# Patient Record
Sex: FEMALE | Race: WHITE | NOT HISPANIC OR LATINO | Employment: FULL TIME | URBAN - METROPOLITAN AREA
[De-identification: names, ages, dates, MRNs, and addresses within clinical notes are randomized per-mention and may not be internally consistent; named-entity substitution may affect disease eponyms.]

---
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
## 2021-06-08 ENCOUNTER — TELEPHONE (OUTPATIENT)
Dept: FAMILY MEDICINE CLINIC | Facility: CLINIC | Age: 21
End: 2021-06-08

## 2021-06-30 ENCOUNTER — OFFICE VISIT (OUTPATIENT)
Dept: FAMILY MEDICINE CLINIC | Facility: CLINIC | Age: 21
End: 2021-06-30

## 2021-06-30 VITALS
HEART RATE: 96 BPM | BODY MASS INDEX: 21.19 KG/M2 | SYSTOLIC BLOOD PRESSURE: 115 MMHG | DIASTOLIC BLOOD PRESSURE: 80 MMHG | OXYGEN SATURATION: 99 % | HEIGHT: 68 IN | RESPIRATION RATE: 16 BRPM | WEIGHT: 139.8 LBS | TEMPERATURE: 98 F

## 2021-06-30 DIAGNOSIS — M25.59 PAIN IN OTHER JOINT: Primary | ICD-10-CM

## 2021-06-30 PROCEDURE — 99214 OFFICE O/P EST MOD 30 MIN: CPT | Performed by: NURSE PRACTITIONER

## 2021-06-30 RX ORDER — NAPROXEN 500 MG/1
500 TABLET ORAL 2 TIMES DAILY WITH MEALS
Qty: 60 TABLET | Refills: 1 | Status: SHIPPED | OUTPATIENT
Start: 2021-06-30 | End: 2021-09-08

## 2021-06-30 NOTE — PROGRESS NOTES
"Chief Complaint  Knee Pain (no prior injuries ) and Ankle Pain (no prior injuries )    Subjective          Mala Kohli presents to [facility] for joint pain    History of Present Illness      Patient reports bilateral knee pain all through high school.  She also has had bilateral ankle pain with the right being worse.  She had right ankle pain after an injury but now is in [employer] and has pain in her bilateral knees and ankles with running or activity.  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  She has history of rheumatoid arthritis in her family.  She has no joint inflammation or swelling that is notable for her.  She does have positive pain relief with NSAIDs.  She has no other complaints    Mala Kohli  has a past medical history of Allergic rhinitis, GERD (gastroesophageal reflux disease), Migraine headache, Pain in joint of left knee, Scoliosis, Sinusitis, and Underweight.      Review of Systems   Constitutional: Negative for fatigue and fever.   Eyes: Negative for visual disturbance.   Respiratory: Negative for cough and shortness of breath.    Cardiovascular: Negative for chest pain and palpitations.   Gastrointestinal: Negative for abdominal pain.   Endocrine: Negative for polyuria.   Genitourinary: Negative for dysuria.   Musculoskeletal: Positive for arthralgias. Negative for joint swelling and neck stiffness.   Skin: Negative for rash.        Objective       Current Outpatient Medications:   •  ESTRADIOL-NORGESTIMATE PO, Take 1 tablet by mouth Daily., Disp: , Rfl:   •  naproxen (Naprosyn) 500 MG tablet, Take 1 tablet by mouth 2 (Two) Times a Day With Meals., Disp: 60 tablet, Rfl: 1    Vital Signs:      /80 (BP Location: Left arm, Patient Position: Sitting, Cuff Size: Adult)   Pulse 96   Temp 98 °F (36.7 °C) (Infrared)   Resp 16   Ht 171.5 cm (67.5\")   Wt 63.4 kg (139 lb 12.8 oz)   SpO2 99%   BMI " "21.57 kg/m²     Vitals:    06/30/21 0828   BP: 115/80   BP Location: Left arm   Patient Position: Sitting   Cuff Size: Adult   Pulse: 96   Resp: 16   Temp: 98 °F (36.7 °C)   TempSrc: Infrared   SpO2: 99%   Weight: 63.4 kg (139 lb 12.8 oz)   Height: 171.5 cm (67.5\")      Physical Exam  Vitals and nursing note reviewed.   Constitutional:       Appearance: She is well-developed.   Cardiovascular:      Rate and Rhythm: Normal rate and regular rhythm.      Heart sounds: Normal heart sounds. No murmur heard.   No friction rub. No gallop.    Pulmonary:      Effort: Pulmonary effort is normal.      Breath sounds: Normal breath sounds. No wheezing or rales.   Musculoskeletal:         General: No swelling or tenderness. Normal range of motion.      Comments: Hands bilateral with DIP and PIP joint tenderness in index finger.  No swelling or warmth   Bilateral knees no laxity or effusion full range of motion no joint tenderness  Bilateral ankles no edema or joint tenderness full range of motion no deformity   Skin:     General: Skin is warm and dry.   Neurological:      Mental Status: She is alert.        Result Review :                PHQ-9 Depression Screening  Little interest or pleasure in doing things? 0   Feeling down, depressed, or hopeless? 0   Trouble falling or staying asleep, or sleeping too much?     Feeling tired or having little energy?     Poor appetite or overeating?     Feeling bad about yourself - or that you are a failure or have let yourself or your family down?     Trouble concentrating on things, such as reading the newspaper or watching television?     Moving or speaking so slowly that other people could have noticed? Or the opposite - being so fidgety or restless that you have been moving around a lot more than usual?     Thoughts that you would be better off dead, or of hurting yourself in some way?     PHQ-9 Total Score 0   If you checked off any problems, how difficult have these problems made it for " you to do your work, take care of things at home, or get along with other people?             Assessment and Plan    Diagnoses and all orders for this visit:    1. Pain in other joint (Primary)  Comments:  Discussed work-up.  We will do labs today and start NSAID.  If not better will consider x-rays and rheumatology referral if indicated  Orders:  -     Sedimentation Rate  -     C-reactive Protein  -     CBC & Differential  -     Comprehensive Metabolic Panel  -     Rheumatoid Factor    Other orders  -     naproxen (Naprosyn) 500 MG tablet; Take 1 tablet by mouth 2 (Two) Times a Day With Meals.  Dispense: 60 tablet; Refill: 1         Problem List Items Addressed This Visit     None      Visit Diagnoses     Pain in other joint    -  Primary    Discussed work-up.  We will do labs today and start NSAID.  If not better will consider x-rays and rheumatology referral if indicated    Relevant Orders    Sedimentation Rate    C-reactive Protein    CBC & Differential    Comprehensive Metabolic Panel    Rheumatoid Factor          Follow Up   Return if symptoms worsen or fail to improve.  Patient was given instructions and counseling regarding her condition or for health maintenance advice. Please see specific information pulled into the AVS if appropriate.

## 2021-07-01 LAB
ALBUMIN SERPL-MCNC: 4.2 G/DL (ref 3.9–5)
ALBUMIN/GLOB SERPL: 1.4 {RATIO} (ref 1.2–2.2)
ALP SERPL-CCNC: 57 IU/L (ref 45–106)
ALT SERPL-CCNC: 10 IU/L (ref 0–32)
AST SERPL-CCNC: 12 IU/L (ref 0–40)
BASOPHILS # BLD AUTO: 0 X10E3/UL (ref 0–0.2)
BASOPHILS NFR BLD AUTO: 1 %
BILIRUB SERPL-MCNC: <0.2 MG/DL (ref 0–1.2)
BUN SERPL-MCNC: 8 MG/DL (ref 6–20)
BUN/CREAT SERPL: 16 (ref 9–23)
CALCIUM SERPL-MCNC: 9.5 MG/DL (ref 8.7–10.2)
CHLORIDE SERPL-SCNC: 107 MMOL/L (ref 96–106)
CO2 SERPL-SCNC: 24 MMOL/L (ref 20–29)
CREAT SERPL-MCNC: 0.49 MG/DL (ref 0.57–1)
CRP SERPL-MCNC: 8 MG/L (ref 0–10)
EOSINOPHIL # BLD AUTO: 0.1 X10E3/UL (ref 0–0.4)
EOSINOPHIL NFR BLD AUTO: 2 %
ERYTHROCYTE [DISTWIDTH] IN BLOOD BY AUTOMATED COUNT: 11.8 % (ref 11.7–15.4)
ERYTHROCYTE [SEDIMENTATION RATE] IN BLOOD BY WESTERGREN METHOD: 2 MM/HR (ref 0–32)
GLOBULIN SER CALC-MCNC: 2.9 G/DL (ref 1.5–4.5)
GLUCOSE SERPL-MCNC: 92 MG/DL (ref 65–99)
HCT VFR BLD AUTO: 39.6 % (ref 34–46.6)
HGB BLD-MCNC: 13.2 G/DL (ref 11.1–15.9)
IMM GRANULOCYTES # BLD AUTO: 0 X10E3/UL (ref 0–0.1)
IMM GRANULOCYTES NFR BLD AUTO: 0 %
LYMPHOCYTES # BLD AUTO: 1.7 X10E3/UL (ref 0.7–3.1)
LYMPHOCYTES NFR BLD AUTO: 29 %
MCH RBC QN AUTO: 29.1 PG (ref 26.6–33)
MCHC RBC AUTO-ENTMCNC: 33.3 G/DL (ref 31.5–35.7)
MCV RBC AUTO: 87 FL (ref 79–97)
MONOCYTES # BLD AUTO: 0.4 X10E3/UL (ref 0.1–0.9)
MONOCYTES NFR BLD AUTO: 7 %
NEUTROPHILS # BLD AUTO: 3.6 X10E3/UL (ref 1.4–7)
NEUTROPHILS NFR BLD AUTO: 61 %
PLATELET # BLD AUTO: 366 X10E3/UL (ref 150–450)
POTASSIUM SERPL-SCNC: 4.5 MMOL/L (ref 3.5–5.2)
PROT SERPL-MCNC: 7.1 G/DL (ref 6–8.5)
RBC # BLD AUTO: 4.54 X10E6/UL (ref 3.77–5.28)
RHEUMATOID FACT SERPL-ACNC: <10 IU/ML (ref 0–13.9)
SODIUM SERPL-SCNC: 141 MMOL/L (ref 134–144)
WBC # BLD AUTO: 5.9 X10E3/UL (ref 3.4–10.8)

## 2021-08-03 ENCOUNTER — OFFICE VISIT (OUTPATIENT)
Dept: FAMILY MEDICINE CLINIC | Facility: CLINIC | Age: 21
End: 2021-08-03

## 2021-08-03 ENCOUNTER — HOSPITAL ENCOUNTER (OUTPATIENT)
Dept: GENERAL RADIOLOGY | Facility: HOSPITAL | Age: 21
Discharge: HOME | End: 2021-08-03

## 2021-08-03 VITALS
HEIGHT: 68 IN | OXYGEN SATURATION: 98 % | RESPIRATION RATE: 16 BRPM | SYSTOLIC BLOOD PRESSURE: 113 MMHG | DIASTOLIC BLOOD PRESSURE: 72 MMHG | HEART RATE: 83 BPM | TEMPERATURE: 97.4 F | BODY MASS INDEX: 21.52 KG/M2 | WEIGHT: 142 LBS

## 2021-08-03 DIAGNOSIS — M25.59 PAIN IN OTHER JOINT: ICD-10-CM

## 2021-08-03 DIAGNOSIS — G89.29 CHRONIC PAIN OF BOTH KNEES: ICD-10-CM

## 2021-08-03 DIAGNOSIS — M25.562 CHRONIC PAIN OF BOTH KNEES: ICD-10-CM

## 2021-08-03 DIAGNOSIS — G89.29 CHRONIC PAIN OF RIGHT ANKLE: ICD-10-CM

## 2021-08-03 DIAGNOSIS — M25.571 CHRONIC PAIN OF RIGHT ANKLE: ICD-10-CM

## 2021-08-03 DIAGNOSIS — G89.29 CHRONIC PAIN OF BOTH KNEES: Primary | ICD-10-CM

## 2021-08-03 DIAGNOSIS — M25.561 CHRONIC PAIN OF BOTH KNEES: Primary | ICD-10-CM

## 2021-08-03 DIAGNOSIS — M25.562 CHRONIC PAIN OF BOTH KNEES: Primary | ICD-10-CM

## 2021-08-03 DIAGNOSIS — M25.561 CHRONIC PAIN OF BOTH KNEES: ICD-10-CM

## 2021-08-03 PROCEDURE — 99213 OFFICE O/P EST LOW 20 MIN: CPT | Performed by: NURSE PRACTITIONER

## 2021-08-03 PROCEDURE — 73610 X-RAY EXAM OF ANKLE: CPT

## 2021-08-03 PROCEDURE — 73560 X-RAY EXAM OF KNEE 1 OR 2: CPT

## 2021-08-03 NOTE — PROGRESS NOTES
"Chief Complaint  Knee Pain and Ankle Pain    Subjective          Mala Kohli presents to [facility] for bilateral knee pain and right ankle pain    History of Present Illness      Patient was here a month ago with bilateral knee pain and some ankle pain.  She reported she had joint pain and was concerned about rheumatoid arthritis.  Lab work was done.  She is in [employer] and reports that she is not had any decrease in pain in her knees or ankle with the naproxen that was given to her.  Labs were all normal.  She has no small joint pain at this point.  She does reports she does not feel like she can run for a drill is coming up.        Mala Kohli  has a past medical history of Allergic rhinitis, GERD (gastroesophageal reflux disease), Migraine headache, Pain in joint of left knee, Scoliosis, Sinusitis, and Underweight.      Review of Systems   Constitutional: Negative for fatigue and fever.   Eyes: Negative for visual disturbance.   Respiratory: Negative for cough and shortness of breath.    Cardiovascular: Negative for chest pain and palpitations.   Gastrointestinal: Negative for abdominal pain.   Endocrine: Negative for polyuria.   Genitourinary: Negative for dysuria.   Musculoskeletal: Positive for arthralgias. Negative for joint swelling and neck stiffness.   Skin: Negative for rash.        Objective       Current Outpatient Medications:   •  ESTRADIOL-NORGESTIMATE PO, Take 1 tablet by mouth Daily., Disp: , Rfl:   •  naproxen (Naprosyn) 500 MG tablet, Take 1 tablet by mouth 2 (Two) Times a Day With Meals., Disp: 60 tablet, Rfl: 1    Vital Signs:      /72 (BP Location: Left arm, Patient Position: Sitting, Cuff Size: Adult)   Pulse 83   Temp 97.4 °F (36.3 °C) (Infrared)   Resp 16   Ht 171.5 cm (67.5\")   Wt 64.4 kg (142 lb)   SpO2 98%   BMI 21.91 kg/m²     Vitals:    08/03/21 1616   BP: 113/72   BP Location: Left arm   Patient Position: Sitting   Cuff " "Size: Adult   Pulse: 83   Resp: 16   Temp: 97.4 °F (36.3 °C)   TempSrc: Infrared   SpO2: 98%   Weight: 64.4 kg (142 lb)   Height: 171.5 cm (67.5\")      Physical Exam  Constitutional:       Appearance: Normal appearance.   HENT:      Head: Normocephalic.   Eyes:      Conjunctiva/sclera: Conjunctivae normal.   Pulmonary:      Effort: Pulmonary effort is normal.   Musculoskeletal:      Comments: Bilateral knees nontender no effusion no laxity full range of motion  Right ankle no edema or effusion nontender full range of motion   Skin:     General: Skin is warm and dry.   Neurological:      Mental Status: She is alert.        Result Review :                PHQ-9 Depression Screening  Little interest or pleasure in doing things?     Feeling down, depressed, or hopeless?     Trouble falling or staying asleep, or sleeping too much?     Feeling tired or having little energy?     Poor appetite or overeating?     Feeling bad about yourself - or that you are a failure or have let yourself or your family down?     Trouble concentrating on things, such as reading the newspaper or watching television?     Moving or speaking so slowly that other people could have noticed? Or the opposite - being so fidgety or restless that you have been moving around a lot more than usual?     Thoughts that you would be better off dead, or of hurting yourself in some way?     PHQ-9 Total Score     If you checked off any problems, how difficult have these problems made it for you to do your work, take care of things at home, or get along with other people?             Assessment and Plan    Diagnoses and all orders for this visit:    1. Chronic pain of both knees (Primary)  Comments:  X-rays today referral to Ortho.  Letter for limitations for running and lifting.  Orders:  -     XR Knee 1 or 2 View Bilateral; Future    2. Pain in other joint  Comments:  All labs are normal including sed rate and C-reactive protein.    3. Chronic pain of right " ankle  Comments:  X-rays today.  Naproxen as needed  Orders:  -     XR Ankle 3+ View Right; Future         Problem List Items Addressed This Visit     None      Visit Diagnoses     Chronic pain of both knees    -  Primary    X-rays today referral to Ortho.  Letter for limitations for running and lifting.    Relevant Orders    XR Knee 1 or 2 View Bilateral    Pain in other joint        All labs are normal including sed rate and C-reactive protein.    Chronic pain of right ankle        X-rays today.  Naproxen as needed    Relevant Orders    XR Ankle 3+ View Right          Follow Up   No follow-ups on file.  Patient was given instructions and counseling regarding her condition or for health maintenance advice. Please see specific information pulled into the AVS if appropriate.

## 2021-08-09 ENCOUNTER — TELEPHONE (OUTPATIENT)
Dept: FAMILY MEDICINE CLINIC | Facility: CLINIC | Age: 21
End: 2021-08-09

## 2021-08-09 DIAGNOSIS — M25.571 CHRONIC PAIN OF RIGHT ANKLE: Primary | ICD-10-CM

## 2021-08-09 DIAGNOSIS — G89.29 CHRONIC PAIN OF RIGHT ANKLE: Primary | ICD-10-CM

## 2021-08-09 NOTE — TELEPHONE ENCOUNTER
Patient is also seeing Dr. Oconnor for her ankle and needs a referral for him as well due to insurance.

## 2021-08-09 NOTE — TELEPHONE ENCOUNTER
Caller: Mala Kohli    Relationship: Self    Best call back number: [phone]*    What is the best time to reach you: ANYTIME    What was the call regarding: PATIENT CALLED AND STATED THAT HER BOYFRIEND DROPPED OFF [employer] PAPERWORK THAT NEEDED TO BE COMPLETED BY GABRIELA ARZATE. THE PATIENT IS WANTING TO KNOW IF THE PAPERWORK IS READY TO BE PICKED UP.    Do you require a callback: YES

## 2021-08-11 ENCOUNTER — OFFICE VISIT (OUTPATIENT)
Dept: ORTHOPEDIC SURGERY | Facility: CLINIC | Age: 21
End: 2021-08-11

## 2021-08-11 VITALS
DIASTOLIC BLOOD PRESSURE: 75 MMHG | SYSTOLIC BLOOD PRESSURE: 112 MMHG | HEART RATE: 81 BPM | WEIGHT: 142 LBS | BODY MASS INDEX: 23.66 KG/M2 | HEIGHT: 65 IN

## 2021-08-11 DIAGNOSIS — M25.561 ACUTE PAIN OF BOTH KNEES: Primary | ICD-10-CM

## 2021-08-11 DIAGNOSIS — M25.562 ACUTE PAIN OF BOTH KNEES: Primary | ICD-10-CM

## 2021-08-11 DIAGNOSIS — M22.2X2 PATELLOFEMORAL SYNDROME OF BOTH KNEES: ICD-10-CM

## 2021-08-11 DIAGNOSIS — M22.2X1 PATELLOFEMORAL SYNDROME OF BOTH KNEES: ICD-10-CM

## 2021-08-11 PROCEDURE — 99203 OFFICE O/P NEW LOW 30 MIN: CPT | Performed by: FAMILY MEDICINE

## 2021-08-11 RX ORDER — MELOXICAM 15 MG/1
15 TABLET ORAL DAILY
Qty: 30 TABLET | Refills: 3 | Status: SHIPPED | OUTPATIENT
Start: 2021-08-11 | End: 2021-09-08

## 2021-08-11 NOTE — PROGRESS NOTES
Primary Care Sports Medicine Office Visit Note     Patient ID: Mala oKhli is a 21 y.o. female.    Chief Complaint:  Chief Complaint   Patient presents with   • Left Knee - Initial Evaluation   • Right Knee - Initial Evaluation     HPI:    Ms. Mala Kohli is a 21 y.o. female who presents to the clinic today for bilateral knee pain. She states knees have hurt since many years now. Pain is described as near constant mild achy pain, but occasionally with use, it cane become severe. She describes a deep pain, behind the knee cap. Popping near every deep flexion. Stairs make her pain worse. Rest improves he issues. Has tried intermittent NSAIDs that seem to help, but pain always returns.     Past Medical History:   Diagnosis Date   • Allergic rhinitis    • GERD (gastroesophageal reflux disease)    • Migraine headache    • Pain in joint of left knee    • Scoliosis    • Sinusitis    • Underweight        History reviewed. No pertinent surgical history.    Family History   Problem Relation Age of Onset   • Skin cancer Mother    • Allergies Mother    • Other Mother         ALLERGIES   • Colon cancer Father    • Allergies Father    • Other Father         ALLERGIES     Social History     Occupational History   • Not on file   Tobacco Use   • Smoking status: Never Smoker   • Smokeless tobacco: Never Used   Vaping Use   • Vaping Use: Never used   Substance and Sexual Activity   • Alcohol use: Never   • Drug use: Never   • Sexual activity: Not on file      Review of Systems   Constitutional: Negative for activity change and fever.   Respiratory: Negative for cough and shortness of breath.    Cardiovascular: Negative for chest pain.   Gastrointestinal: Negative for constipation, diarrhea, nausea and vomiting.   Musculoskeletal: Positive for arthralgias.   Skin: Negative for color change and rash.   Neurological: Negative for weakness.   Hematological: Does not bruise/bleed easily.     Objective:    /75   Pulse  "81   Ht 163.8 cm (64.5\")   Wt 64.4 kg (142 lb)   BMI 24.00 kg/m²     Physical Examination:  Physical Exam  Vitals and nursing note reviewed.   Constitutional:       General: She is not in acute distress.     Appearance: She is well-developed. She is not diaphoretic.   HENT:      Head: Normocephalic and atraumatic.   Eyes:      Conjunctiva/sclera: Conjunctivae normal.   Pulmonary:      Effort: Pulmonary effort is normal. No respiratory distress.   Musculoskeletal:      Right knee: No effusion.      Instability Tests: Medial Alicja test negative and lateral Alicja test negative.      Left knee: No effusion.      Instability Tests: Medial Alicja test negative and lateral Alicja test negative.   Skin:     General: Skin is warm.      Capillary Refill: Capillary refill takes less than 2 seconds.   Neurological:      Mental Status: She is alert.       Right Ankle Exam     Range of Motion   The patient has normal right ankle ROM.      Left Ankle Exam     Range of Motion   The patient has normal left ankle ROM.       Right Knee Exam     Muscle Strength   The patient has normal right knee strength.    Tenderness   The patient is experiencing tenderness in the lateral retinaculum.    Range of Motion   The patient has normal right knee ROM.  Extension: normal   Flexion: normal     Tests   Alicja:  Medial - negative Lateral - negative  Varus: negative Valgus: negative  Lachman:  Anterior - negative      Drawer:  Anterior - negative    Posterior - negative    Other   Erythema: absent  Sensation: normal (Distal to the knee. DP and PT palpable. )  Pulse: present  Swelling: none  Effusion: no effusion present      Left Knee Exam     Muscle Strength   The patient has normal left knee strength.    Tenderness   The patient is experiencing tenderness in the lateral retinaculum.    Range of Motion   The patient has normal left knee ROM.  Extension: normal   Flexion: normal     Tests   Alicja:  Medial - negative Lateral - " "negative  Varus: negative Valgus: negative  Lachman:  Anterior - negative      Drawer:  Anterior - negative     Posterior - negative    Other   Erythema: absent  Effusion: no effusion present      Right Hip Exam     Range of Motion   The patient has normal right hip ROM.      Left Hip Exam     Range of Motion   The patient has normal left hip ROM.        Imaging and other tests:  2 view XR bilateral knees dated 8/3/2021 yields the following IMPRESSION:  1. No acute osseous abnormality or significant degenerative changes of  the bilateral knees.    Assessment and Plan:    1. Acute pain of both knees  - Ambulatory Referral to Physical Therapy Evaluate and treat (VMO strength, Patellafemoral syndrome. hoffa fat pad impingement ); Stretching, Strengthening    2. Patellofemoral syndrome of both knees    I discussed pathology and treatment options with the patient today, would like for start meloxicam for anti-inflammatory benefit.  Otherwise, start physical therapy for improved patellar function.  RTC in 2-3 months if no improvement.    Milton WATSON \"Chance\" Tate CABRERA DO, CAQSM  08/11/21  17:05 EDT    Disclaimer: Please note that areas of this note were completed with computer voice recognition software.  Quite often unanticipated grammatical, syntax, homophones, and other interpretive errors are inadvertently transcribed by the computer software. Please excuse any errors that have escaped final proofreading.  "

## 2021-08-19 ENCOUNTER — OFFICE VISIT (OUTPATIENT)
Dept: PODIATRY | Facility: CLINIC | Age: 21
End: 2021-08-19

## 2021-08-19 VITALS
DIASTOLIC BLOOD PRESSURE: 78 MMHG | SYSTOLIC BLOOD PRESSURE: 121 MMHG | HEIGHT: 65 IN | HEART RATE: 76 BPM | BODY MASS INDEX: 23.82 KG/M2 | WEIGHT: 143 LBS

## 2021-08-19 DIAGNOSIS — M25.871 ANKLE IMPINGEMENT SYNDROME, RIGHT: ICD-10-CM

## 2021-08-19 DIAGNOSIS — M25.571 ACUTE RIGHT ANKLE PAIN: Primary | ICD-10-CM

## 2021-08-19 PROCEDURE — 99203 OFFICE O/P NEW LOW 30 MIN: CPT | Performed by: PODIATRIST

## 2021-08-19 NOTE — PATIENT INSTRUCTIONS
Anterior Ankle Impingement  Anterior ankle impingement is a condition that causes pain and swelling in the front of the ankle. It is more common in people who are physically active.  What are the causes?  This condition may be caused by:  · Soft tissue getting caught between the shin bone (tibia) and the top ankle bone (talus).  · Extra bone (bone spur) on the tibia or talus.  This condition can develop due to:  · Repeated and forceful movement of your foot upward toward your shin.  · Repeated hits to the front of the ankle, such as from a soccer ball.  · Damage to the tough bands of tissue that connect bones together (ligaments).  What increases the risk?  You are more likely to develop this condition if you:  · Sprain your ankle often.  · Take part in certain sports, such as:  ? Soccer.  ? Football.  ? Ballet.  ? Gymnastics.  ? Running.  What are the signs or symptoms?  Symptoms of this condition include:  · Ankle pain that gets worse when you move your foot toward your shin and do certain activities, such as:  ? Going up stairs or a ladder.  ? Walking.  ? Running uphill.  ? Squatting.  · Pain when pushing on the front of the ankle (tenderness).  · Ankle swelling.  · Ankle stiffness.  · A feeling of getting stuck when you try to move your foot toward your shin.  How is this diagnosed?  This condition may be diagnosed based on:  · Your symptoms.  · Your medical history.  · A physical exam. During the exam, your health care provider will test for stiffness, tenderness, and pain.  · Imaging tests, such as:  ? An X-ray.  ? A CT scan. This may be done to check for bone damage.  ? MRI. This may be done to check for ligament damage.  ? An ultrasound. This may be done to check for soft tissue damage.  How is this treated?  This condition may be treated by:  · Not using your ankle to support (bear) your body weight for several days.  · Wearing a removable boot, brace, or splint for ankle support.  · Using ice to reduce  swelling.  · Taking anti-inflammatory pain medicine.  · Having medicines injected into your ankle joint to reduce pain and swelling.  · Doing exercises when pain and swelling improve.  · Using a heel lift.  · Returning to full activity gradually.  · Having surgery. Surgery is needed if there is a large or loose bone spur or if other treatments do not help. The surgery may be done to:  ? Remove bone spurs.  ? Enlarge the joint space.  ? Repair damaged ligaments.  Follow these instructions at home:  If you have a boot, brace, or splint:  · Wear it as told by your health care provider. Remove it only as told by your health care provider.  · Loosen it if your toes tingle, become numb, or turn cold and blue.  · Keep it clean.  · If it is not waterproof:  ? Do not let it get wet.  ? Cover it with a watertight covering when you take a bath or a shower.  Managing pain, stiffness, and swelling    · If directed, put ice on the injured area:  ? Put ice in a plastic bag.  ? Place a towel between your skin and the bag.  ? Leave the ice on for 20 minutes, 2-3 times a day.  · Move your toes often to avoid stiffness and to lessen swelling.  · Raise (elevate) the injured area above the level of your heart while you are sitting or lying down.  · Take over-the-counter and prescription medicines only as told by your health care provider.  Activity  · Return to your normal activities as told by your health care provider. Ask your health care provider what activities are safe for you.  · Do not use the injured limb to support your body weight until your health care provider says that you can. Use crutches or a walker as told by your health care provider.  · Do not do any activities that make pain or swelling worse.  · Do exercises as told by your health care provider.  Driving  · Ask your health care provider when it is safe to drive with a boot, brace, or splint on your foot or leg.  · Do not drive or use heavy machinery while taking  prescription pain medicine.  General instructions  · Use a heel lift as told by your health care provider.  · Do not use any products that contain nicotine or tobacco, such as cigarettes, e-cigarettes, and chewing tobacco. If you need help quitting, ask your health care provider.  · Keep all follow-up visits as told by your health care provider. This is important.  How is this prevented?  · Wear supportive footwear that is appropriate for your athletic activity.  · Avoid athletic activities that cause ankle pain or swelling.  · Before you do any athletic activity, do ankle range-of-motion and strengthening exercises as told by your health care provider.  · If you start any new athletic activity, start gradually to build up your strength and flexibility.  Contact a health care provider if you:  · Have ankle pain or swelling that is not getting better.  · Cannot put your body weight on your foot without feeling pain.  · Have an ankle sprain that causes pain and swelling for more than 2-4 weeks.  Summary  · Anterior ankle impingement is a condition that causes pain and swelling in the front of the ankle.  · This causes ankle pain that gets worse when you move your foot.  · It is treated with rest, ice, and pain medications when necessary.  This information is not intended to replace advice given to you by your health care provider. Make sure you discuss any questions you have with your health care provider.  Document Revised: 04/07/2020 Document Reviewed: 06/10/2019  [employer] Patient Education © 2021 [employer]

## 2021-08-19 NOTE — PROGRESS NOTES
08/19/2021  Foot and Ankle Surgery - New Patient   Provider: Dr. Florentino Oconnor DPM  Location: [facility]    Subjective:  Mala Kohli is a 21 y.o. female.     Chief Complaint   Patient presents with   • Right Ankle - Pain   • Ankle Pain     last pcp appt 8/2/2021       HPI: Patient is a 21-year-old female that presents with longstanding issues involving her right ankle.  She complains of intermittent pain involving the anterior lateral aspect of the ankle with increased activity.  She states that certain activities do cause discomfort to this area.  She first noticed the issues in 2019.  She has not had any significant injuries that she is aware of.  She does rate the discomfort a 6 out of 10 when noticed.  She has not had this issue formally evaluated.  She did discuss the matters with her primary care physician who did obtain imaging and has referred her to me for management.    Allergies   Allergen Reactions   • Penicillin G Hives       Past Medical History:   Diagnosis Date   • Allergic rhinitis    • GERD (gastroesophageal reflux disease)    • Migraine headache    • Pain in joint of left knee    • Scoliosis    • Sinusitis    • Underweight        History reviewed. No pertinent surgical history.    Family History   Problem Relation Age of Onset   • Skin cancer Mother    • Allergies Mother    • Other Mother         ALLERGIES   • Colon cancer Father    • Allergies Father    • Other Father         ALLERGIES       Social History     Socioeconomic History   • Marital status: Single     Spouse name: Not on file   • Number of children: Not on file   • Years of education: Not on file   • Highest education level: Not on file   Tobacco Use   • Smoking status: Never Smoker   • Smokeless tobacco: Never Used   Vaping Use   • Vaping Use: Never used   Substance and Sexual Activity   • Alcohol use: Never   • Drug use: Never        Current Outpatient Medications on File Prior to Visit   Medication Sig Dispense  "Refill   • ESTRADIOL-NORGESTIMATE PO Take 1 tablet by mouth Daily.     • meloxicam (Mobic) 15 MG tablet Take 1 tablet by mouth Daily. 30 tablet 3   • naproxen (Naprosyn) 500 MG tablet Take 1 tablet by mouth 2 (Two) Times a Day With Meals. 60 tablet 1     No current facility-administered medications on file prior to visit.       Review of Systems:  General: Denies fever, chills, fatigue, and weakness.  Eyes: Denies vision loss, blurry vision, and excessive redness.  ENT: Denies hearing issues and difficulty swallowing.  Cardiovascular: Denies palpitations, chest pain, or syncopal episodes.  Respiratory: Denies shortness of breath, wheezing, and coughing.  GI: Denies abdominal pain, nausea, and vomiting.   : Denies frequency, hematuria, and urgency.  Musculoskeletal: + Right ankle pain  Derm: Denies rash, open wounds, or suspicious lesions.  Neuro: Denies headaches, numbness, loss of coordination, and tremors.  Psych: Denies anxiety and depression.  Endocrine: Denies temperature intolerance and changes in appetite.  Heme: Denies bleeding disorders or abnormal bruising.     Objective   /78   Pulse 76   Ht 163.8 cm (64.5\")   Wt 64.9 kg (143 lb)   BMI 24.17 kg/m²     Foot/Ankle Exam:       General:   Appearance: appears stated age and healthy    Orientation: AAOx3    Affect: appropriate    Gait: unimpaired      VASCULAR      Right Foot Vascularity   Normal vascular exam    Dorsalis pedis:  2+  Posterior tibial:  2+  Skin Temperature: warm    Edema Grading:  None  CFT:  < 3 seconds  Pedal Hair Growth:  Present  Varicosities: none        NEUROLOGIC     Right Foot Neurologic   Light touch sensation:  Normal  Hot/Cold sensation: normal    Achilles reflex:  2+     MUSCULOSKELETAL      Right Foot Musculoskeletal   Ecchymosis:  None  Tenderness: none    Arch:  Normal     MUSCLE STRENGTH     Right Foot Muscle Strength   Normal strength    Foot dorsiflexion:  5  Foot plantar flexion:  5  Foot inversion:  5  Foot " eversion:  5     DERMATOLOGIC     Right Foot Dermatologic   Skin: skin intact       TESTS     Right Foot Tests   Anterior drawer: negative    Varus tilt: negative        Right Foot Additional Comments Mild discomfort noted to the anterior lateral aspect of the ankle.  Positive impingement sign.  No gross instability with anterior drawer or talar tilt testing.  No deformity.  No proximal fibular pain.      Assessment/Plan   Diagnoses and all orders for this visit:    1. Acute right ankle pain (Primary)  -     Ambulatory Referral to Physical Therapy Evaluate and treat    2. Ankle impingement syndrome, right      Patient presents with longstanding issues involving the anterior aspect of the right ankle.  She recently discussed this with her primary care physician did obtain imaging.  Imaging was independently reviewed showing no obvious fracture, dislocation, or degenerative changes.  Clinically, she does have mild discomfort involving the ankle which could be consistent with impingement syndrome.  I did discuss the diagnosis and treatment options.  I have recommended that we improve the support and function.  I have asked that she acquire a pair of over-the-counter arch supports and wear on a routine basis.  I would also like her to start formal physical therapy.  We did discuss at home stretching and manual therapy exercises.  She is to monitor closely and call with any additional issues or concerns.  I will see her in 4 to 6 weeks for reevaluation.  If she continues to have pain and limitation, will consider proceeding with MRI for further evaluation.  Greater than 30 minutes was spent before, during, and after evaluation for patient care    Orders Placed This Encounter   Procedures   • Ambulatory Referral to Physical Therapy Evaluate and treat     Referral Priority:   Routine     Referral Type:   Physical Therapy     Referral Reason:   Specialty Services Required     Requested Specialty:   Physical Therapy      Number of Visits Requested:   1        Note is dictated utilizing voice recognition software. Unfortunately this leads to occasional typographical errors. I apologize in advance if the situation occurs. If questions occur please do not hesitate to call our office.

## 2021-09-02 ENCOUNTER — TREATMENT (OUTPATIENT)
Dept: PHYSICAL THERAPY | Facility: CLINIC | Age: 21
End: 2021-09-02

## 2021-09-02 DIAGNOSIS — M25.562 ACUTE PAIN OF LEFT KNEE: ICD-10-CM

## 2021-09-02 DIAGNOSIS — M25.561 ACUTE PAIN OF RIGHT KNEE: Primary | ICD-10-CM

## 2021-09-02 PROCEDURE — 97161 PT EVAL LOW COMPLEX 20 MIN: CPT | Performed by: PHYSICAL THERAPIST

## 2021-09-02 NOTE — PROGRESS NOTES
Physical Therapy Initial Evaluation and Plan of Care    Patient: Mala Kohli   : 2000  Diagnosis/ICD-10 Code:  Acute pain of right knee [M25.561]  Referring practitioner: Milton Ybarra I*  Date of Initial Visit: 2021  Today's Date: 2021  Patient seen for 1 sessions           Subjective Questionnaire: LEFS: 49/80 ( 39%)      Subjective Evaluation    History of Present Illness  Mechanism of injury: Pt is referred to therapy due to pain in both knees. Has been having problems with her knee since high school. She is in [employer]. No know injury. Constant dull ache but it can get really bad with inc activities and even affects her sleep.     Aggravating factors: squatting, driving a stick shift , stairs, running, prolonged walking, hiking.     Relieving factors: rest, IBP    Functional limitation: running, hiking, performing high impact physical activities            Quality of life: good    Pain  Current pain ratin  At best pain ratin  At worst pain ratin  Quality: dull ache, tight and discomfort  Relieving factors: rest  Aggravating factors: stairs, standing, ambulation, prolonged positioning and squatting  Progression: no change    Social Support  Lives in: multiple-level home    Diagnostic Tests  X-ray: normal    Patient Goals  Patient goals for therapy: decreased pain, increased motion, increased strength and return to sport/leisure activities           Precautions:     Objective          Observations     Additional Hip Observation Details  No gait deviation.  Mild pronation    Tenderness   Left Knee   Tenderness in the inferior patella, lateral patella and medial patella.     Right Knee   Tenderness in the inferior patella, lateral patella and medial patella.     Active Range of Motion   Left Hip   Normal active range of motion    Right Hip   Normal active range of motion  Left Knee   Normal active range of motion    Right Knee   Normal active range of  motion    Additional Active Range of Motion Details  Inc pain with flex    Patellar Mobility   Left Knee Hypermobile in the left medial, left lateral, left superior and left inferior patellar tendon(s).     Right Knee Hypermobile in the medial, lateral, superior and inferior patellar tendon(s).     Patellar Static Positioning   Left Knee: medial tilt  Right Knee: medial tilt    Strength/Myotome Testing     Left Hip   Planes of Motion   Flexion: 4+  Extension: 4+  Abduction: 4+  Adduction: 4+    Right Hip   Planes of Motion   Flexion: 4+  Extension: 4+  Abduction: 4+  Adduction: 4+    Left Knee   Flexion: 4  Extension: 4  Quadriceps contraction: fair    Right Knee   Flexion: 4  Extension: 4  Quadriceps contraction: fair    Left Hip Flexibility Comments:   Mod tightness HS    Right Hip Flexibility Comments:   Mod tightness HS          Assessment & Plan     Assessment  Impairments: abnormal muscle firing, activity intolerance, lacks appropriate home exercise program, pain with function and weight-bearing intolerance  Assessment details: Pt is a 22 y/o f referred to therapy due to chronic B knee pain. Pt presents with impaired strength, dec sunil to prolonged walking/ standing/ running and physical activities.   Upon initial evaluation pt exhibits the above impairments and functional limitations. Impairments affect ability to run, hike and tolerate high impact activities. Pt will benefit from skilled physical therapy to address impairments, resolve pain and maximize function.      Prognosis: good  Functional Limitations: walking, uncomfortable because of pain, standing and stooping  Goals  Plan Goals: STGs: in 4 weeks    1- Pt will tolerate  initial HEP and progression of her exercise program  2- Pt will be I with initial HEP  3- Pt will reports at least 35 % improvement and pain reduction  4- B knee and hip strength to be wnl    LTGs: by DC     1- Pt will report at least 85 % improvement and pain reduction  2- Pt will be  I with final HEP and self management of her condition  3- Pt will have improved LEFS score compare to initial score indicating improved function and pain reduction  4- Pt will voice readiness for DC with I program   5- Pt will be able to negotiate stairs without inc pain    6- Pt will be able to perf 10 squats without inc pain    Plan  Therapy options: will be seen for skilled physical therapy services  Planned modality interventions: high voltage pulsed current (pain management) and cryotherapy  Planned therapy interventions: functional ROM exercises, gait training, home exercise program, manual therapy, neuromuscular re-education, strengthening and therapeutic activities  Frequency: 2x week  Treatment plan discussed with: patient  Plan details: 20 visits        See flow sheet for treatment detail    History # of Personal Factors and/or Comorbidities: LOW (0)  Examination of Body System(s): # of elements: LOW (1-2)  Clinical Presentation: STABLE   Clinical Decision Making: LOW           Timed:         Manual Therapy:         mins  82734;     Therapeutic Exercise:         mins  42400;     Neuromuscular Kalpana:        mins  19567;    Therapeutic Activity:          mins  04161;     Gait Training:           mins  54706;     Ultrasound:          mins  69865;    Ionto                                   mins   08623  Self Care                            mins   63542  Canal repositioning           mins    26196      Un-Timed:  Electrical Stimulation:         mins  09455 ( );  Dry Needling          mins self-pay  Traction          mins 32627  Low Eval   45       Mins  02843  Mod Eval          Mins  79092  High Eval                            Mins  47948  Re-Eval                               mins  53453        Timed Treatment:      mins   Total Treatment:     45   mins    PT SIGNATURE: Samuel Oshea PT, CLT   DATE TREATMENT INITIATED: 9/2/2021    Initial Certification  Certification Period: 12/1/2021  I certify that the  therapy services are furnished while this patient is under my care.  The services outlined above are required by this patient, and will be reviewed every 90 days.     PHYSICIAN: Milton Ybarra II, DO      DATE:     Please sign and return via fax to [phone].. Thank you, [facility].

## 2021-09-08 RX ORDER — NAPROXEN 500 MG/1
TABLET ORAL
Qty: 60 TABLET | Refills: 1 | Status: SHIPPED | OUTPATIENT
Start: 2021-09-08 | End: 2023-03-31

## 2021-09-23 ENCOUNTER — TREATMENT (OUTPATIENT)
Dept: PHYSICAL THERAPY | Facility: CLINIC | Age: 21
End: 2021-09-23

## 2021-09-23 DIAGNOSIS — M25.562 ACUTE PAIN OF LEFT KNEE: ICD-10-CM

## 2021-09-23 DIAGNOSIS — M25.561 ACUTE PAIN OF RIGHT KNEE: Primary | ICD-10-CM

## 2021-09-23 PROCEDURE — 97110 THERAPEUTIC EXERCISES: CPT | Performed by: PHYSICAL THERAPIST

## 2021-09-23 PROCEDURE — 97530 THERAPEUTIC ACTIVITIES: CPT | Performed by: PHYSICAL THERAPIST

## 2021-09-23 NOTE — PROGRESS NOTES
Physical Therapy Daily Progress Note    Patient: Mala Kohli  : 2000  Referring practitioner: Milton Ybarra I*  Today's Date: 2021    VISIT#: 2   visits  Exp: 21  Subjective   Pt reports: doing a little better, hasn't had intense pain and really bad days since she started the exercises.       Objective     See Exercise, Manual, and Modality Logs for complete treatment.     Patient Education:    Assessment & Plan     Assessment  Assessment details: Good tolerance to today's treatment session. Subjective improvement is reported. No pain during today's session.           Progress per Plan of Care            Timed:         Manual Therapy:         mins  75632;     Therapeutic Exercise:    20     mins  32676;     Neuromuscular Kalpana:        mins  75342;    Therapeutic Activity:     15     mins  99043;     Gait Training:           mins  16932;     Ultrasound:          mins  22488;    Ionto                                   mins   01220  Self Care                            mins   48351  Canal repositioning           mins    56742    Un-Timed:  Electrical Stimulation:         mins  76957 ( );  Traction          mins 80736  Low Eval          Mins  46155  Mod Eval          Mins  76626  High Eval                            Mins  23650  Re-Eval                               mins  84333    Timed Treatment:  35    mins   Total Treatment:    35    mins    Samuel Oshea, PT, CLT  Physical Therapist

## 2021-09-28 ENCOUNTER — TREATMENT (OUTPATIENT)
Dept: PHYSICAL THERAPY | Facility: CLINIC | Age: 21
End: 2021-09-28

## 2021-09-28 DIAGNOSIS — M25.561 ACUTE PAIN OF RIGHT KNEE: Primary | ICD-10-CM

## 2021-09-28 DIAGNOSIS — M25.562 ACUTE PAIN OF LEFT KNEE: ICD-10-CM

## 2021-09-28 PROCEDURE — 97110 THERAPEUTIC EXERCISES: CPT | Performed by: PHYSICAL THERAPIST

## 2021-09-28 PROCEDURE — 97530 THERAPEUTIC ACTIVITIES: CPT | Performed by: PHYSICAL THERAPIST

## 2021-09-28 NOTE — PROGRESS NOTES
Physical Therapy Daily Progress Note    Patient: Mala Kohli  : 2000  Referring practitioner: Milton Ybarra I*  Today's Date: 2021    VISIT#: 3   visits  Exp: 21    Subjective   Pt reports: doing pretty good today. Feels the exercises have been very beneficial. Hasn't had much pain lately.       Objective     See Exercise, Manual, and Modality Logs for complete treatment.     Patient Education:    Assessment & Plan     Assessment  Assessment details: Good sunil to today's treatment session and progression of her ex program. Seems to be responding well to therapy. Subjective improvement is reported.           Progress per Plan of Care            Timed:         Manual Therapy:         mins  68337;     Therapeutic Exercise:   20      mins  86633;     Neuromuscular Kalpana:        mins  50070;    Therapeutic Activity:    10      mins  68063;     Gait Training:           mins  10485;     Ultrasound:          mins  84434;    Ionto                                   mins   86007  Self Care                            mins   38311  Canal repositioning           mins    66126    Un-Timed:  Electrical Stimulation:         mins  16196 ( );  Traction          mins 65628  Low Eval          Mins  32353  Mod Eval          Mins  12960  High Eval                            Mins  98354  Re-Eval                               mins  09536    Timed Treatment:  30    mins   Total Treatment:    30    mins    Samuel Oshea, PT, CLT  Physical Therapist

## 2021-09-30 ENCOUNTER — TREATMENT (OUTPATIENT)
Dept: PHYSICAL THERAPY | Facility: CLINIC | Age: 21
End: 2021-09-30

## 2021-09-30 DIAGNOSIS — M25.561 ACUTE PAIN OF RIGHT KNEE: Primary | ICD-10-CM

## 2021-09-30 DIAGNOSIS — M25.562 ACUTE PAIN OF LEFT KNEE: ICD-10-CM

## 2021-09-30 PROCEDURE — 97112 NEUROMUSCULAR REEDUCATION: CPT | Performed by: PHYSICAL THERAPIST

## 2021-09-30 PROCEDURE — 97110 THERAPEUTIC EXERCISES: CPT | Performed by: PHYSICAL THERAPIST

## 2021-09-30 NOTE — PROGRESS NOTES
Physical Therapy Daily Progress Note    Patient: Mala Kohli  : 2000  Referring practitioner: Milton Ybarra I*  Today's Date: 2021    VISIT#: 4  3/8 visits  Exp: 21    Subjective   Pt reports: doing pretty good this morning. Denies any pain.       Objective     See Exercise, Manual, and Modality Logs for complete treatment.     Patient Education:    Assessment & Plan     Assessment  Assessment details: Good sunil to today's treatment session and progression of her ex program. Demos understanding of her HEP. Remained painfree during today's session.           Progress per Plan of Care            Timed:         Manual Therapy:         mins  24572;     Therapeutic Exercise:   15      mins  41409;     Neuromuscular Kalpana:  15      mins  68357;    Therapeutic Activity:          mins  25667;     Gait Training:           mins  30269;     Ultrasound:          mins  31915;    Ionto                                   mins   06913  Self Care                            mins   20314  Canal repositioning           mins    96814    Un-Timed:  Electrical Stimulation:         mins  24485 ( );  Traction          mins 60933  Low Eval          Mins  04849  Mod Eval          Mins  86782  High Eval                            Mins  67481  Re-Eval                               mins  33097    Timed Treatment:  30    mins   Total Treatment:     30   mins    Samuel Oshea, PT, CLT  Physical Therapist

## 2021-10-05 ENCOUNTER — TREATMENT (OUTPATIENT)
Dept: PHYSICAL THERAPY | Facility: CLINIC | Age: 21
End: 2021-10-05

## 2021-10-05 DIAGNOSIS — M25.561 ACUTE PAIN OF RIGHT KNEE: Primary | ICD-10-CM

## 2021-10-05 PROCEDURE — 97110 THERAPEUTIC EXERCISES: CPT | Performed by: PHYSICAL THERAPIST

## 2021-10-05 PROCEDURE — 97530 THERAPEUTIC ACTIVITIES: CPT | Performed by: PHYSICAL THERAPIST

## 2021-10-05 NOTE — PROGRESS NOTES
Physical Therapy Daily Progress Note        Patient: Mala Kohli   : 2000  Diagnosis/ICD-10 Code:  Acute pain of right knee [M25.561]  Referring practitioner: Milton Ybarra I*  Date of Initial Visit: Type: THERAPY  Noted: 2021  Today's Date: 10/5/2021  Patient seen for 5 sessions.  POC 20, 2x/wk, exp 21  Insurance  exp 21             Subjective :  No new complaints.  She thinks PT is helping as she has more good days than bad days now.     Objective   See Exercise, Manual, and Modality Logs for complete treatment.     EDUCATION:      Assessment/Plan:  Pt. With very poor posture; forward head and rounded shoulders.  Cueing to correct.  R ankle consistently pops with there ex but no pain.  She is seeing Dr. Oconnor for R ankle. No issues/symptoms with ex/activity.     Progress per Plan of Care           Timed:                                                                           Manual Therapy:                 mins  95427;                      Therapeutic Exercise:   15      mins  05600;     Neuromuscular Kalpana:        mins  01807;    Therapeutic Activity:      15     mins  28903;     Gait Training:                      mins  19180;     Ultrasound:                          mins  33909;    Ionto                                   mins   87125  Self Care                            mins   39214  Canal repositioning           mins    64107        Un-Timed:  Electrical Stimulation:         mins  30458 ( );  Dry Needling                       mins self-pay  Traction                               mins 70166  Low Eval                             Mins  51955  Mod Eval                             Mins  80628  High Eval                            Mins  41038  Re-Eval                               mins  40140           Timed Treatment:   30   mins   Total Treatment:     30   mins        Krystle Werner PTA  Physical Therapist Assistant

## 2021-10-07 ENCOUNTER — TREATMENT (OUTPATIENT)
Dept: PHYSICAL THERAPY | Facility: CLINIC | Age: 21
End: 2021-10-07

## 2021-10-07 DIAGNOSIS — M25.562 ACUTE PAIN OF LEFT KNEE: ICD-10-CM

## 2021-10-07 DIAGNOSIS — M25.561 ACUTE PAIN OF RIGHT KNEE: Primary | ICD-10-CM

## 2021-10-07 PROCEDURE — 97530 THERAPEUTIC ACTIVITIES: CPT | Performed by: PHYSICAL THERAPIST

## 2021-10-07 PROCEDURE — 97110 THERAPEUTIC EXERCISES: CPT | Performed by: PHYSICAL THERAPIST

## 2021-10-07 NOTE — PROGRESS NOTES
Physical Therapy Daily Progress Note    Patient: Mala Kohli  : 2000  Referring practitioner: Milton Ybarra I*  Today's Date: 10/7/2021    VISIT#: 6    Subjective   Pt reports: doing pretty well, hasn't had any pain in the past few days. Feels therapy has been beneficial.       Objective     See Exercise, Manual, and Modality Logs for complete treatment.     Patient Education:    Assessment & Plan     Assessment  Assessment details: Pt tolerated today's treatment session well , tolerating progression of her ex program and has responded well to therapy. 3/4 STGs met , making progress towards LTGs.     Goals  Plan Goals: Plan Goals: STGs: in 4 weeks    1- Pt will tolerate  initial HEP and progression of her exercise program ( MET 10/7)  2- Pt will be I with initial HEP ( MET 10/)  3- Pt will reports at least 35 % improvement and pain reduction ( MET 10/)  4- B knee and hip strength to be wnl    LTGs: by DC     1- Pt will report at least 85 % improvement and pain reduction  2- Pt will be I with final HEP and self management of her condition  3- Pt will have improved LEFS score compare to initial score indicating improved function and pain reduction  4- Pt will voice readiness for DC with I program   5- Pt will be able to negotiate stairs without inc pain    6- Pt will be able to perf 10 squats without inc pain           Progress per Plan of Care            Timed:         Manual Therapy:         mins  09160;     Therapeutic Exercise:    15     mins  90071;     Neuromuscular Kalpana:        mins  03209;    Therapeutic Activity:    15     mins  59037;     Gait Training:           mins  68813;     Ultrasound:          mins  48247;    Ionto                                   mins   06149  Self Care                            mins   18997  Canal repositioning           mins    93769    Un-Timed:  Electrical Stimulation:         mins  16190 ( );  Traction          mins 90072  Low Eval          Mins   52478  Mod Eval          Mins  76597  High Eval                            Mins  43530  Re-Eval                               mins  20031    Timed Treatment:  30    mins   Total Treatment:    30    mins    Samuel Oshea PT, CLT  Physical Therapist

## 2021-10-12 ENCOUNTER — TREATMENT (OUTPATIENT)
Dept: PHYSICAL THERAPY | Facility: CLINIC | Age: 21
End: 2021-10-12

## 2021-10-12 DIAGNOSIS — M25.562 ACUTE PAIN OF LEFT KNEE: ICD-10-CM

## 2021-10-12 DIAGNOSIS — M25.561 ACUTE PAIN OF RIGHT KNEE: Primary | ICD-10-CM

## 2021-10-12 PROCEDURE — 97110 THERAPEUTIC EXERCISES: CPT | Performed by: PHYSICAL THERAPIST

## 2021-10-12 PROCEDURE — 97530 THERAPEUTIC ACTIVITIES: CPT | Performed by: PHYSICAL THERAPIST

## 2021-10-12 NOTE — PROGRESS NOTES
Physical Therapy Daily Progress Note    Patient: Mala Kohli  : 2000  Referring practitioner: Milton Ybarra I*  Today's Date: 10/12/2021    VISIT#: 7   visits  Exp:     Subjective   Pt reports: doing pretty well, her R knee has been bothering her a little because of the car she has been driving. She is a little too close and doesn't have enough room to stretch it. Overall doing a lot better.       Objective     See Exercise, Manual, and Modality Logs for complete treatment.     Patient Education:    Assessment & Plan     Assessment  Assessment details: Good sunil to today's treatment session and progression of her ex program. Subjective improvement is reported. Responding well to therapy.           Progress per Plan of Care            Timed:         Manual Therapy:         mins  76224;     Therapeutic Exercise:    15     mins  90452;     Neuromuscular Kalpana:        mins  00720;    Therapeutic Activity:    15      mins  75272;     Gait Training:           mins  89930;     Ultrasound:          mins  69394;    Ionto                                   mins   80690  Self Care                            mins   19684  Canal repositioning           mins    47011    Un-Timed:  Electrical Stimulation:         mins  85701 ( );  Traction         mins 81091  Low Eval          Mins  22418  Mod Eval          Mins  68873  High Eval                            Mins  63922  Re-Eval                               mins  92225    Timed Treatment:  30    mins   Total Treatment:    30    mins    Samuel Oshea PT, CLT  Physical Therapist

## 2021-10-20 ENCOUNTER — TREATMENT (OUTPATIENT)
Dept: PHYSICAL THERAPY | Facility: CLINIC | Age: 21
End: 2021-10-20

## 2021-10-20 DIAGNOSIS — M25.562 ACUTE PAIN OF LEFT KNEE: ICD-10-CM

## 2021-10-20 DIAGNOSIS — M25.561 ACUTE PAIN OF RIGHT KNEE: Primary | ICD-10-CM

## 2021-10-20 PROCEDURE — 97530 THERAPEUTIC ACTIVITIES: CPT | Performed by: PHYSICAL THERAPIST

## 2021-10-20 PROCEDURE — 97110 THERAPEUTIC EXERCISES: CPT | Performed by: PHYSICAL THERAPIST

## 2021-10-20 NOTE — PROGRESS NOTES
Physical Therapy Daily Progress Note    Patient: Mala Kohli  : 2000  Referring practitioner: Milton Ybarra I*  Today's Date: 10/20/2021    VISIT#: 8   visits  Exp     Subjective   Pt reports: doing pretty well, hasn't had any pain in the past few weeks. Feels therapy has been very beneficial.       Objective     See Exercise, Manual, and Modality Logs for complete treatment.     Patient Education:    Assessment & Plan     Assessment  Assessment details: Good sunil to today's treatment session. Has responded well to therapy and voices readiness for DC after next visit.     Plan  Plan details: Possible DC after next visit                      Timed:         Manual Therapy:         mins  42486;     Therapeutic Exercise:    15     mins  86215;     Neuromuscular Kalpana:        mins  61163;    Therapeutic Activity:     15     mins  24401;     Gait Training:           mins  51027;     Ultrasound:          mins  85967;    Ionto                                   mins   74133  Self Care                            mins   60069  Canal repositioning           mins    29123    Un-Timed:  Electrical Stimulation:         mins  64905 ( );  Traction          mins 61375  Low Eval          Mins  58929  Mod Eval          Mins  81184  High Eval                            Mins  96125  Re-Eval                               mins  10364    Timed Treatment: 30     mins   Total Treatment:    30    mins    Samuel Oshea, PT, CLT  Physical Therapist

## 2021-10-27 ENCOUNTER — TREATMENT (OUTPATIENT)
Dept: PHYSICAL THERAPY | Facility: CLINIC | Age: 21
End: 2021-10-27

## 2021-10-27 DIAGNOSIS — M25.562 ACUTE PAIN OF LEFT KNEE: ICD-10-CM

## 2021-10-27 DIAGNOSIS — M25.561 ACUTE PAIN OF RIGHT KNEE: Primary | ICD-10-CM

## 2021-10-27 PROCEDURE — 97110 THERAPEUTIC EXERCISES: CPT | Performed by: PHYSICAL THERAPIST

## 2021-10-27 PROCEDURE — 97530 THERAPEUTIC ACTIVITIES: CPT | Performed by: PHYSICAL THERAPIST

## 2021-10-27 NOTE — PROGRESS NOTES
Physical Therapy  Progress Note/ DC summary    Patient: Mala Kohli  : 2000  Referring practitioner: Milton Ybarra I*  Today's Date: 10/27/2021    VISIT#: 9   visits  Exp     Subjective   Pt reports: she is doing well. Hasn't had any pain since start of therapy. Feels she is ready for DC to continue with her HEP.       Objective          Strength/Myotome Testing     Left Hip   Normal muscle strength    Right Hip   Normal muscle strength    Left Knee   Normal strength  Quadriceps contraction: good    Right Knee   Normal strength  Quadriceps contraction: good        See Exercise, Manual, and Modality Logs for complete treatment.     Patient Education:    Assessment & Plan     Assessment  Assessment details: Pt was referred to therapy due to pain in B knees. She has been seen for 9 visits and has responded well to therapy. Has been painfree for the past several weeks and since started therapy. She is I with her HEP and voices readiness for DC with I program and self management. All STGs and LTGs met.   LEFS score: 78/80    Goals  Plan Goals: Plan Goals: STGs: in 4 weeks ( all MET)    1- Pt will tolerate  initial HEP and progression of her exercise program  2- Pt will be I with initial HEP  3- Pt will reports at least 35 % improvement and pain reduction  4- B knee and hip strength to be wnl    LTGs: by DC ( all MET)    1- Pt will report at least 85 % improvement and pain reduction  2- Pt will be I with final HEP and self management of her condition  3- Pt will have improved LEFS score compare to initial score indicating improved function and pain reduction  4- Pt will voice readiness for DC with I program   5- Pt will be able to negotiate stairs without inc pain    6- Pt will be able to perf 10 squats without inc pain    Plan  Plan details: Pt will be DC                       Timed:         Manual Therapy:         mins  10541;     Therapeutic Exercise:   15      mins  43491;      Neuromuscular Kalpana:        mins  31269;    Therapeutic Activity:     15     mins  85368;     Gait Training:           mins  99768;     Ultrasound:          mins  18422;    Ionto                                   mins   71928  Self Care                            mins   83113  Canal repositioning           mins    79698    Un-Timed:  Electrical Stimulation:         mins  09803 ( );  Traction          mins 90476  Low Eval          Mins  10998  Mod Eval          Mins  02824  High Eval                            Mins  31294  Re-Eval                               mins  33116    Timed Treatment: 30     mins   Total Treatment:    30    mins    Samuel Oshea PT, CLT  Physical Therapist

## 2021-11-01 ENCOUNTER — OFFICE VISIT (OUTPATIENT)
Dept: PODIATRY | Facility: CLINIC | Age: 21
End: 2021-11-01

## 2021-11-01 VITALS
BODY MASS INDEX: 23.82 KG/M2 | HEART RATE: 81 BPM | DIASTOLIC BLOOD PRESSURE: 66 MMHG | SYSTOLIC BLOOD PRESSURE: 101 MMHG | HEIGHT: 65 IN | WEIGHT: 143 LBS

## 2021-11-01 DIAGNOSIS — M25.571 ACUTE RIGHT ANKLE PAIN: Primary | ICD-10-CM

## 2021-11-01 DIAGNOSIS — M25.871 ANKLE IMPINGEMENT SYNDROME, RIGHT: ICD-10-CM

## 2021-11-01 PROCEDURE — 99213 OFFICE O/P EST LOW 20 MIN: CPT | Performed by: PODIATRIST

## 2021-11-02 NOTE — PROGRESS NOTES
"11/01/2021  Foot and Ankle Surgery - Established Patient/Follow-up  Provider: Dr. Florentino Oconnor DPM  Location: [facility]    Subjective:  Mala Kohli is a 21 y.o. female.     Chief Complaint   Patient presents with   • Right Ankle - Pain   • Follow-up     last pcp appt 8/3/2021       HPI: Patient returns for follow-up regarding right ankle pain.  She continues to have pain and limitation.  Her symptoms are present with increased activity and improved with rest.  She is tired of dealing with the pain and limitation.    Allergies   Allergen Reactions   • Penicillin G Hives       Current Outpatient Medications on File Prior to Visit   Medication Sig Dispense Refill   • ESTRADIOL-NORGESTIMATE PO Take 1 tablet by mouth Daily.     • naproxen (NAPROSYN) 500 MG tablet TAKE 1 TABLET BY MOUTH TWICE A DAY WITH MEALS 60 tablet 1     No current facility-administered medications on file prior to visit.       Objective   /66   Pulse 81   Ht 163.8 cm (64.5\")   Wt 64.9 kg (143 lb)   BMI 24.17 kg/m²     General:   Appearance: appears stated age and healthy    Orientation: AAOx3    Affect: appropriate    Gait: unimpaired       VASCULAR       Right Foot Vascularity   Normal vascular exam    Dorsalis pedis:  2+  Posterior tibial:  2+  Skin Temperature: warm    Edema Grading:  None  CFT:  < 3 seconds  Pedal Hair Growth:  Present  Varicosities: none        NEUROLOGIC      Right Foot Neurologic   Light touch sensation:  Normal  Hot/Cold sensation: normal    Achilles reflex:  2+      MUSCULOSKELETAL       Right Foot Musculoskeletal   Ecchymosis:  None  Tenderness: none    Arch:  Normal      MUSCLE STRENGTH      Right Foot Muscle Strength   Normal strength    Foot dorsiflexion:  5  Foot plantar flexion:  5  Foot inversion:  5  Foot eversion:  5      DERMATOLOGIC      Right Foot Dermatologic   Skin: skin intact        TESTS      Right Foot Tests   Anterior drawer: negative    Varus tilt: negative      Continued " tenderness with palpation to the anterior lateral aspect of the ankle.  Positive impingement sign.    Assessment/Plan   Diagnoses and all orders for this visit:    1. Acute right ankle pain (Primary)    2. Ankle impingement syndrome, right  -     MRI Ankle Right Without Contrast; Future      Patient continues to have prominent pain involving the right ankle despite conservative care.  Her symptoms are noticed intermittently and mostly with activity.  On exam, she does have a positive impingement sign.  I have recommended that we proceed with an MRI for further evaluation of the ankle.  Her plain film imaging shows no osseous abnormality.  I have also dispensed a lace up ankle brace and reviewed proper use and effects.  I would like her to monitor and to return in 2 weeks for reevaluation after the MRI has been completed for further discussion and planning.  Greater than 20 minutes was spent before, during, and after evaluation for patient care    Orders Placed This Encounter   Procedures   • MRI Ankle Right Without Contrast     Standing Status:   Future     Standing Expiration Date:   11/1/2022     Order Specific Question:   Patient Pregnant     Answer:   No     Order Specific Question:   Release to patient     Answer:   Immediate          Note is dictated utilizing voice recognition software. Unfortunately this leads to occasional typographical errors. I apologize in advance if the situation occurs. If questions occur please do not hesitate to call our office.

## 2021-11-03 ENCOUNTER — TELEMEDICINE (OUTPATIENT)
Dept: FAMILY MEDICINE CLINIC | Facility: TELEHEALTH | Age: 21
End: 2021-11-03

## 2021-11-03 DIAGNOSIS — R11.2 NAUSEA AND VOMITING, INTRACTABILITY OF VOMITING NOT SPECIFIED, UNSPECIFIED VOMITING TYPE: Primary | ICD-10-CM

## 2021-11-03 PROCEDURE — 99212 OFFICE O/P EST SF 10 MIN: CPT | Performed by: NURSE PRACTITIONER

## 2021-11-03 NOTE — PATIENT INSTRUCTIONS
Nausea, Adult  Nausea is feeling sick to your stomach or feeling that you are about to throw up (vomit). Feeling sick to your stomach is usually not serious, but it may be an early sign of a more serious medical problem.  As you feel sicker to your stomach, you may throw up. If you throw up, or if you are not able to drink enough fluids, there is a risk that you may lose too much water in your body (get dehydrated). If you lose too much water in your body, you may:  · Feel tired.  · Feel thirsty.  · Have a dry mouth.  · Have cracked lips.  · Go pee (urinate) less often.  Older adults and people who have other diseases or a weak body defense system (immune system) have a higher risk of losing too much water in the body.  The main goals of treating this condition are:  · To relieve your nausea.  · To ensure your nausea occurs less often.  · To prevent throwing up and losing too much fluid.  Follow these instructions at home:  Watch your symptoms for any changes. Tell your doctor about them. Follow these instructions as told by your doctor.  Eating and drinking         · Take an ORS (oral rehydration solution). This is a drink that is sold at pharmacies and stores.  · Drink clear fluids in small amounts as you are able. These include:  ? Water.  ? Ice chips.  ? Fruit juice that has water added (diluted fruit juice).  ? Low-calorie sports drinks.  · Eat bland, easy-to-digest foods in small amounts as you are able, such as:  ? Bananas.  ? Applesauce.  ? Rice.  ? Low-fat (lean) meats.  ? Toast.  ? Crackers.  · Avoid drinking fluids that have a lot of sugar or caffeine in them. This includes energy drinks, sports drinks, and soda.  · Avoid alcohol.  · Avoid spicy or fatty foods.  General instructions  · Take over-the-counter and prescription medicines only as told by your doctor.  · Rest at home while you get better.  · Drink enough fluid to keep your pee (urine) pale yellow.  · Take slow and deep breaths when you feel  sick to your stomach.  · Avoid food or things that have strong smells.  · Wash your hands often with soap and water. If you cannot use soap and water, use hand .  · Make sure that all people in your home wash their hands well and often.  · Keep all follow-up visits as told by your doctor. This is important.  Contact a doctor if:  · You feel sicker to your stomach.  · You feel sick to your stomach for more than 2 days.  · You throw up.  · You are not able to drink fluids without throwing up.  · You have new symptoms.  · You have a fever.  · You have a headache.  · You have muscle cramps.  · You have a rash.  · You have pain while peeing.  · You feel light-headed or dizzy.  Get help right away if:  · You have pain in your chest, neck, arm, or jaw.  · You feel very weak or you pass out (faint).  · You have throw up that is bright red or looks like coffee grounds.  · You have bloody or black poop (stools) or poop that looks like tar.  · You have a very bad headache, a stiff neck, or both.  · You have very bad pain, cramping, or bloating in your belly (abdomen).  · You have trouble breathing or you are breathing very quickly.  · Your heart is beating very quickly.  · Your skin feels cold and clammy.  · You feel confused.  · You have signs of losing too much water in your body, such as:  ? Dark pee, very little pee, or no pee.  ? Cracked lips.  ? Dry mouth.  ? Sunken eyes.  ? Sleepiness.  ? Weakness.  These symptoms may be an emergency. Do not wait to see if the symptoms will go away. Get medical help right away. Call your local emergency services (911 in the [place] Do not drive yourself to the hospital.  Summary  · Nausea is feeling sick to your stomach or feeling that you are about to throw up (vomit).  · If you throw up, or if you are not able to drink enough fluids, there is a risk that you may lose too much water in your body (get dehydrated).  · Eat and drink what your doctor tells you. Take  over-the-counter and prescription medicines only as told by your doctor.  · Contact a doctor right away if your symptoms get worse or you have new symptoms.  · Keep all follow-up visits as told by your doctor. This is important.  This information is not intended to replace advice given to you by your health care provider. Make sure you discuss any questions you have with your health care provider.  Document Revised: 11/17/2020 Document Reviewed: 05/28/2019  Elsevier Patient Education © 2021 [employer]

## 2021-11-03 NOTE — PROGRESS NOTES
You have chosen to receive care through a telehealth visit.  Do you consent to use a video/audio connection for your medical care today? Yes     CHIEF COMPLAINT  Chief Complaint   Patient presents with   • Nausea   • Vomiting         HPI  Mala Kohli is a 21 y.o. female  presents with complaint of being nauseated with vomiting on Monday and Tuesday of this week. Today she is better and is tolerating liquids and solid foods without N/V or diarrhea. She denies abdominal cramping. She is seeking a note for work stating this information.     Review of Systems   Constitutional: Negative.    HENT: Negative.    Cardiovascular: Negative.    Gastrointestinal: Negative.    Hematological: Negative.    Psychiatric/Behavioral: Negative.        Past Medical History:   Diagnosis Date   • Allergic rhinitis    • GERD (gastroesophageal reflux disease)    • Migraine headache    • Pain in joint of left knee    • Scoliosis    • Sinusitis    • Underweight        Family History   Problem Relation Age of Onset   • Skin cancer Mother    • Allergies Mother    • Other Mother         ALLERGIES   • Colon cancer Father    • Allergies Father    • Other Father         ALLERGIES       Social History     Socioeconomic History   • Marital status: Single   Tobacco Use   • Smoking status: Never Smoker   • Smokeless tobacco: Never Used   Vaping Use   • Vaping Use: Never used   Substance and Sexual Activity   • Alcohol use: Never   • Drug use: Never         There were no vitals taken for this visit.    PHYSICAL EXAM  Physical Exam   Constitutional: She is oriented to person, place, and time. She appears well-developed and well-nourished. She does not have a sickly appearance. She does not appear ill. No distress.   Pulmonary/Chest: Effort normal.   Abdominal: Soft. She exhibits no distension. There is no abdominal tenderness.   Neurological: She is alert and oriented to person, place, and time.   Psychiatric: She has a normal mood and affect.    Vitals reviewed.      Results for orders placed or performed in visit on 06/30/21   Sedimentation Rate    Specimen: Blood   Result Value Ref Range    Sed Rate 2 0 - 32 mm/hr   C-reactive Protein    Specimen: Blood   Result Value Ref Range    C-Reactive Protein 8 0 - 10 mg/L   Comprehensive Metabolic Panel    Specimen: Blood   Result Value Ref Range    Glucose 92 65 - 99 mg/dL    BUN 8 6 - 20 mg/dL    Creatinine 0.49 (L) 0.57 - 1.00 mg/dL    eGFR Non African Am 141 >59 mL/min/1.73    eGFR African Am 162 >59 mL/min/1.73    BUN/Creatinine Ratio 16 9 - 23    Sodium 141 134 - 144 mmol/L    Potassium 4.5 3.5 - 5.2 mmol/L    Chloride 107 (H) 96 - 106 mmol/L    Total CO2 24 20 - 29 mmol/L    Calcium 9.5 8.7 - 10.2 mg/dL    Total Protein 7.1 6.0 - 8.5 g/dL    Albumin 4.2 3.9 - 5.0 g/dL    Globulin 2.9 1.5 - 4.5 g/dL    A/G Ratio 1.4 1.2 - 2.2    Total Bilirubin <0.2 0.0 - 1.2 mg/dL    Alkaline Phosphatase 57 45 - 106 IU/L    AST (SGOT) 12 0 - 40 IU/L    ALT (SGPT) 10 0 - 32 IU/L   Rheumatoid Factor    Specimen: Blood   Result Value Ref Range    RA Latex Turbid <10.0 0.0 - 13.9 IU/mL   CBC & Differential    Specimen: Blood   Result Value Ref Range    WBC 5.9 3.4 - 10.8 x10E3/uL    RBC 4.54 3.77 - 5.28 x10E6/uL    Hemoglobin 13.2 11.1 - 15.9 g/dL    Hematocrit 39.6 34.0 - 46.6 %    MCV 87 79 - 97 fL    MCH 29.1 26.6 - 33.0 pg    MCHC 33.3 31.5 - 35.7 g/dL    RDW 11.8 11.7 - 15.4 %    Platelets 366 150 - 450 x10E3/uL    Neutrophil Rel % 61 Not Estab. %    Lymphocyte Rel % 29 Not Estab. %    Monocyte Rel % 7 Not Estab. %    Eosinophil Rel % 2 Not Estab. %    Basophil Rel % 1 Not Estab. %    Neutrophils Absolute 3.6 1.4 - 7.0 x10E3/uL    Lymphocytes Absolute 1.7 0.7 - 3.1 x10E3/uL    Monocytes Absolute 0.4 0.1 - 0.9 x10E3/uL    Eosinophils Absolute 0.1 0.0 - 0.4 x10E3/uL    Basophils Absolute 0.0 0.0 - 0.2 x10E3/uL    Immature Granulocyte Rel % 0 Not Estab. %    Immature Grans Absolute 0.0 0.0 - 0.1 x10E3/uL       Diagnoses and  all orders for this visit:    1. Nausea and vomiting, intractability of vomiting not specified, unspecified vomiting type (Primary)    Resume normal diet   Follow up for worsening s/s        FOLLOW-UP  As discussed during visit with PCP/[facility] if no improvement or Urgent Care/Emergency Department if worsening of symptoms    Patient verbalizes understanding of medication dosage, comfort measures, instructions for treatment and follow-up.    Laurie Romano, KRISTINE  11/03/2021  13:10 EDT    This visit was performed via Telehealth.  This patient has been instructed to follow-up with their primary care provider if their symptoms worsen or the treatment provided does not resolve their illness.

## 2021-11-04 ENCOUNTER — TELEPHONE (OUTPATIENT)
Dept: ORTHOPEDIC SURGERY | Facility: CLINIC | Age: 21
End: 2021-11-04

## 2021-11-04 NOTE — TELEPHONE ENCOUNTER
Caller: Mala Kohli    Relationship: Self    Best call back number: [phone]    What form or medical record are you requesting: PATIENT NEEDS DR NOTE THAT SAYS SHE SHOULD BE WEARING BRACE AND ATHLETIC SHOES    Who is requesting this form or medical record from you: EMPLOYER    Timeframe paperwork needed: ASAP    Additional notes: PATIENT IS ASKING IF THIS CAN BE EMAILED OR UPLOADED IN Get[employer]. PLEASE ADVISE.

## 2021-11-04 NOTE — TELEPHONE ENCOUNTER
Provider: LUISA   Caller: Minerva  Phone Number: [phone]  Reason for Call: pt reached out to let us know that her grandma is available to stop by and  the letter. Tamara swartz

## 2021-11-05 ENCOUNTER — TELEPHONE (OUTPATIENT)
Dept: ORTHOPEDIC SURGERY | Facility: CLINIC | Age: 21
End: 2021-11-05

## 2021-11-05 NOTE — TELEPHONE ENCOUNTER
Provider: DR. MORAN  Caller: SAM MCCAIN  Relationship to Patient: SELF  Phone Number: [phone]  Reason for Call: PATIENT'S GRANDMOTHER TREMAINE MURPHY WILL BE STOPPING BY TODAY TO  PAPERWORK AND SHE HAS ADDITIONAL  PAPERWORK TO DROP OFF THAT NEEDS TO BE FILLED OUT IN REGARDS TO HER ANKLE. PLEASE ADVISE.

## 2021-11-26 ENCOUNTER — HOSPITAL ENCOUNTER (OUTPATIENT)
Dept: MRI IMAGING | Facility: HOSPITAL | Age: 21
Discharge: HOME | End: 2021-11-26
Admitting: PODIATRIST

## 2021-11-26 DIAGNOSIS — M25.871 ANKLE IMPINGEMENT SYNDROME, RIGHT: ICD-10-CM

## 2021-11-26 PROCEDURE — 73721 MRI JNT OF LWR EXTRE W/O DYE: CPT

## 2022-03-17 ENCOUNTER — TELEMEDICINE (OUTPATIENT)
Dept: FAMILY MEDICINE CLINIC | Facility: TELEHEALTH | Age: 22
End: 2022-03-17

## 2022-03-17 DIAGNOSIS — J06.9 VIRAL UPPER RESPIRATORY TRACT INFECTION: Primary | ICD-10-CM

## 2022-03-17 PROCEDURE — 99213 OFFICE O/P EST LOW 20 MIN: CPT | Performed by: NURSE PRACTITIONER

## 2022-03-17 NOTE — PATIENT INSTRUCTIONS
Drink plenty of water  Over the counter pain relievers okay   If symptoms do not improve in 3-5 days follow up with your primary care provider or urgent care    Upper Respiratory Infection, Adult  An upper respiratory infection (URI) is a common viral infection of the nose, throat, and upper air passages that lead to the lungs. The most common type of URI is the common cold. URIs usually get better on their own, without medical treatment.  What are the causes?  A URI is caused by a virus. You may catch a virus by:  Breathing in droplets from an infected person's cough or sneeze.  Touching something that has been exposed to the virus (contaminated) and then touching your mouth, nose, or eyes.  What increases the risk?  You are more likely to get a URI if:  You are very young or very old.  It is mayela or winter.  You have close contact with others, such as at a , school, or health care facility.  You smoke.  You have long-term (chronic) heart or lung disease.  You have a weakened disease-fighting (immune) system.  You have nasal allergies or asthma.  You are experiencing a lot of stress.  You work in an area that has poor air circulation.  You have poor nutrition.  What are the signs or symptoms?  A URI usually involves some of the following symptoms:  Runny or stuffy (congested) nose.  Sneezing.  Cough.  Sore throat.  Headache.  Fatigue.  Fever.  Loss of appetite.  Pain in your forehead, behind your eyes, and over your cheekbones (sinus pain).  Muscle aches.  Redness or irritation of the eyes.  Pressure in the ears or face.  How is this diagnosed?  This condition may be diagnosed based on your medical history and symptoms, and a physical exam. Your health care provider may use a cotton swab to take a mucus sample from your nose (nasal swab). This sample can be tested to determine what virus is causing the illness.  How is this treated?  URIs usually get better on their own within 7-10 days. You can take steps  at home to relieve your symptoms. Medicines cannot cure URIs, but your health care provider may recommend certain medicines to help relieve symptoms, such as:  Over-the-counter cold medicines.  Cough suppressants. Coughing is a type of defense against infection that helps to clear the respiratory system, so take these medicines only as recommended by your health care provider.  Fever-reducing medicines.  Follow these instructions at home:  Activity  Rest as needed.  If you have a fever, stay home from work or school until your fever is gone or until your health care provider says you are no longer contagious. Your health care provider may have you wear a face mask to prevent your infection from spreading.  Relieving symptoms  Gargle with a salt-water mixture 3-4 times a day or as needed. To make a salt-water mixture, completely dissolve ½-1 tsp of salt in 1 cup of warm water.  Use a cool-mist humidifier to add moisture to the air. This can help you breathe more easily.  Eating and drinking    Drink enough fluid to keep your urine pale yellow.  Eat soups and other clear broths.    General instructions    Take over-the-counter and prescription medicines only as told by your health care provider. These include cold medicines, fever reducers, and cough suppressants.  Do not use any products that contain nicotine or tobacco, such as cigarettes and e-cigarettes. If you need help quitting, ask your health care provider.  Stay away from secondhand smoke.  Stay up to date on all immunizations, including the yearly (annual) flu vaccine.  Keep all follow-up visits as told by your health care provider. This is important.    How to prevent the spread of infection to others    URIs can be passed from person to person (are contagious). To prevent the infection from spreading:  Wash your hands often with soap and water. If soap and water are not available, use hand .  Avoid touching your mouth, face, eyes, or nose.  Cough  or sneeze into a tissue or your sleeve or elbow instead of into your hand or into the air.    Contact a health care provider if:  You are getting worse instead of better.  You have a fever or chills.  Your mucus is brown or red.  You have yellow or brown discharge coming from your nose.  You have pain in your face, especially when you bend forward.  You have swollen neck glands.  You have pain while swallowing.  You have white areas in the back of your throat.  Get help right away if:  You have shortness of breath that gets worse.  You have severe or persistent:  Headache.  Ear pain.  Sinus pain.  Chest pain.  You have chronic lung disease along with any of the following:  Wheezing.  Prolonged cough.  Coughing up blood.  A change in your usual mucus.  You have a stiff neck.  You have changes in your:  Vision.  Hearing.  Thinking.  Mood.  Summary  An upper respiratory infection (URI) is a common infection of the nose, throat, and upper air passages that lead to the lungs.  A URI is caused by a virus.  URIs usually get better on their own within 7-10 days.  Medicines cannot cure URIs, but your health care provider may recommend certain medicines to help relieve symptoms.  This information is not intended to replace advice given to you by your health care provider. Make sure you discuss any questions you have with your health care provider.  Document Revised: 12/26/2019 Document Reviewed: 08/03/2018  [employer] Patient Education © 2021 [employer]

## 2022-03-17 NOTE — PROGRESS NOTES
You have chosen to receive care through a telehealth visit.  Do you consent to use a video/audio connection for your medical care today? Yes     CHIEF COMPLAINT  Chief Complaint   Patient presents with   • URI         HPI  Mala Kohli is a 22 y.o. female  presents with complaint of cold symptoms with fever. She is in the [employer] and needs documentation that she is sick. Low grade temperature.   Negative COVID-19.     Review of Systems   Constitutional: Negative for chills, diaphoresis, fatigue and fever.   HENT: Positive for congestion, postnasal drip, rhinorrhea and sneezing. Negative for sore throat.    Respiratory: Positive for cough (mild ). Negative for chest tightness, shortness of breath and wheezing.    Cardiovascular: Negative for chest pain.   Gastrointestinal: Negative for diarrhea, nausea and vomiting.   Musculoskeletal: Negative for myalgias.   Neurological: Positive for headaches.   Hematological: Negative for adenopathy.       Past Medical History:   Diagnosis Date   • Allergic rhinitis    • GERD (gastroesophageal reflux disease)    • Migraine headache    • Pain in joint of left knee    • Scoliosis    • Sinusitis    • Underweight        Family History   Problem Relation Age of Onset   • Skin cancer Mother    • Allergies Mother    • Other Mother         ALLERGIES   • Colon cancer Father    • Allergies Father    • Other Father         ALLERGIES       Social History     Socioeconomic History   • Marital status: Single   Tobacco Use   • Smoking status: Never Smoker   • Smokeless tobacco: Never Used   Vaping Use   • Vaping Use: Never used   Substance and Sexual Activity   • Alcohol use: Never   • Drug use: Never         Breastfeeding No     PHYSICAL EXAM  Physical Exam   Constitutional: She is oriented to person, place, and time. She appears well-developed and well-nourished. She does not have a sickly appearance. She does not appear ill. No distress.   HENT:   Head: Normocephalic and  atraumatic.   Eyes: EOM are normal.   Neck: Neck normal appearance.  Pulmonary/Chest: Effort normal.  No respiratory distress.  Neurological: She is alert and oriented to person, place, and time.   Skin: Skin is dry.   Psychiatric: She has a normal mood and affect.         Diagnoses and all orders for this visit:    1. Viral upper respiratory tract infection (Primary)          FOLLOW-UP  As discussed during visit with PCP/[facility] if no improvement or Urgent Care/Emergency Department if worsening of symptoms    Patient verbalizes understanding of medication dosage, comfort measures, instructions for treatment and follow-up.    Shira Bailon, KRISTINE  03/17/2022  18:48 EDT    This visit was performed via Telehealth.  This patient has been instructed to follow-up with their primary care provider if their symptoms worsen or the treatment provided does not resolve their illness.

## 2022-04-05 ENCOUNTER — TELEMEDICINE (OUTPATIENT)
Dept: FAMILY MEDICINE CLINIC | Facility: TELEHEALTH | Age: 22
End: 2022-04-05

## 2022-04-05 DIAGNOSIS — J01.40 ACUTE PANSINUSITIS, RECURRENCE NOT SPECIFIED: Primary | ICD-10-CM

## 2022-04-05 DIAGNOSIS — H10.31 ACUTE BACTERIAL CONJUNCTIVITIS OF RIGHT EYE: ICD-10-CM

## 2022-04-05 PROCEDURE — 99213 OFFICE O/P EST LOW 20 MIN: CPT | Performed by: NURSE PRACTITIONER

## 2022-04-05 RX ORDER — BENZONATATE 200 MG/1
200 CAPSULE ORAL 3 TIMES DAILY PRN
Qty: 21 CAPSULE | Refills: 0 | Status: SHIPPED | OUTPATIENT
Start: 2022-04-05 | End: 2022-04-19

## 2022-04-05 RX ORDER — DOXYCYCLINE 100 MG/1
100 CAPSULE ORAL 2 TIMES DAILY
Qty: 20 CAPSULE | Refills: 0 | Status: SHIPPED | OUTPATIENT
Start: 2022-04-05 | End: 2022-04-15

## 2022-04-05 RX ORDER — TOBRAMYCIN 3 MG/ML
2 SOLUTION/ DROPS OPHTHALMIC EVERY 8 HOURS SCHEDULED
Qty: 3 ML | Refills: 0 | Status: SHIPPED | OUTPATIENT
Start: 2022-04-05 | End: 2022-04-12

## 2022-04-05 RX ORDER — METHYLPREDNISOLONE 4 MG/1
TABLET ORAL
Qty: 21 TABLET | Refills: 0 | Status: SHIPPED | OUTPATIENT
Start: 2022-04-05 | End: 2022-04-19

## 2022-04-05 NOTE — PATIENT INSTRUCTIONS
Sinusitis, Adult  Sinusitis is inflammation of your sinuses. Sinuses are hollow spaces in the bones around your face. Your sinuses are located:  Around your eyes.  In the middle of your forehead.  Behind your nose.  In your cheekbones.  Mucus normally drains out of your sinuses. When your nasal tissues become inflamed or swollen, mucus can become trapped or blocked. This allows bacteria, viruses, and fungi to grow, which leads to infection. Most infections of the sinuses are caused by a virus.  Sinusitis can develop quickly. It can last for up to 4 weeks (acute) or for more than 12 weeks (chronic). Sinusitis often develops after a cold.  What are the causes?  This condition is caused by anything that creates swelling in the sinuses or stops mucus from draining. This includes:  Allergies.  Asthma.  Infection from bacteria or viruses.  Deformities or blockages in your nose or sinuses.  Abnormal growths in the nose (nasal polyps).  Pollutants, such as chemicals or irritants in the air.  Infection from fungi (rare).  What increases the risk?  You are more likely to develop this condition if you:  Have a weak body defense system (immune system).  Do a lot of swimming or diving.  Overuse nasal sprays.  Smoke.  What are the signs or symptoms?  The main symptoms of this condition are pain and a feeling of pressure around the affected sinuses. Other symptoms include:  Stuffy nose or congestion.  Thick drainage from your nose.  Swelling and warmth over the affected sinuses.  Headache.  Upper toothache.  A cough that may get worse at night.  Extra mucus that collects in the throat or the back of the nose (postnasal drip).  Decreased sense of smell and taste.  Fatigue.  A fever.  Sore throat.  Bad breath.  How is this diagnosed?  This condition is diagnosed based on:  Your symptoms.  Your medical history.  A physical exam.  Tests to find out if your condition is acute or chronic. This may include:  Checking your nose for nasal  polyps.  Viewing your sinuses using a device that has a light (endoscope).  Testing for allergies or bacteria.  Imaging tests, such as an MRI or CT scan.  In rare cases, a bone biopsy may be done to rule out more serious types of fungal sinus disease.  How is this treated?  Treatment for sinusitis depends on the cause and whether your condition is chronic or acute.  If caused by a virus, your symptoms should go away on their own within 10 days. You may be given medicines to relieve symptoms. They include:  Medicines that shrink swollen nasal passages (topical intranasal decongestants).  Medicines that treat allergies (antihistamines).  A spray that eases inflammation of the nostrils (topical intranasal corticosteroids).  Rinses that help get rid of thick mucus in your nose (nasal saline washes).  If caused by bacteria, your health care provider may recommend waiting to see if your symptoms improve. Most bacterial infections will get better without antibiotic medicine. You may be given antibiotics if you have:  A severe infection.  A weak immune system.  If caused by narrow nasal passages or nasal polyps, you may need to have surgery.  Follow these instructions at home:  Medicines  Take, use, or apply over-the-counter and prescription medicines only as told by your health care provider. These may include nasal sprays.  If you were prescribed an antibiotic medicine, take it as told by your health care provider. Do not stop taking the antibiotic even if you start to feel better.  Hydrate and humidify    Drink enough fluid to keep your urine pale yellow. Staying hydrated will help to thin your mucus.  Use a cool mist humidifier to keep the humidity level in your home above 50%.  Inhale steam for 10-15 minutes, 3-4 times a day, or as told by your health care provider. You can do this in the bathroom while a hot shower is running.  Limit your exposure to cool or dry air.    Rest  Rest as much as possible.  Sleep with your  head raised (elevated).  Make sure you get enough sleep each night.  General instructions    Apply a warm, moist washcloth to your face 3-4 times a day or as told by your health care provider. This will help with discomfort.  Wash your hands often with soap and water to reduce your exposure to germs. If soap and water are not available, use hand .  Do not smoke. Avoid being around people who are smoking (secondhand smoke).  Keep all follow-up visits as told by your health care provider. This is important.    Contact a health care provider if:  You have a fever.  Your symptoms get worse.  Your symptoms do not improve within 10 days.  Get help right away if:  You have a severe headache.  You have persistent vomiting.  You have severe pain or swelling around your face or eyes.  You have vision problems.  You develop confusion.  Your neck is stiff.  You have trouble breathing.  Summary  Sinusitis is soreness and inflammation of your sinuses. Sinuses are hollow spaces in the bones around your face.  This condition is caused by nasal tissues that become inflamed or swollen. The swelling traps or blocks the flow of mucus. This allows bacteria, viruses, and fungi to grow, which leads to infection.  If you were prescribed an antibiotic medicine, take it as told by your health care provider. Do not stop taking the antibiotic even if you start to feel better.  Keep all follow-up visits as told by your health care provider. This is important.  This information is not intended to replace advice given to you by your health care provider. Make sure you discuss any questions you have with your health care provider.  Document Revised: 05/20/2019 Document Reviewed: 05/20/2019  [employer] Patient Education © 2021 [employer]  Bacterial Conjunctivitis, Adult  Bacterial conjunctivitis is an infection of the clear membrane that covers the white part of your eye and the inner surface of your eyelid (conjunctiva). When the blood  vessels in your conjunctiva become inflamed, your eye becomes red or pink, and it will probably feel itchy. Bacterial conjunctivitis spreads very easily from person to person (is contagious). It also spreads easily from one eye to the other eye.  What are the causes?  This condition is caused by bacteria. You may get the infection if you come into close contact with:  A person who is infected with the bacteria.  Items that are contaminated with the bacteria, such as a face towel, contact lens solution, or eye makeup.  What increases the risk?  You are more likely to develop this condition if you:  Are exposed to other people who have the infection.  Wear contact lenses.  Have a sinus infection.  Have had a recent eye injury or surgery.  Have a weak body defense system (immune system).  Have a medical condition that causes dry eyes.  What are the signs or symptoms?  Symptoms of this condition include:  Thick, yellowish discharge from the eye. This may turn into a crust on the eyelid overnight and cause your eyelids to stick together.  Tearing or watery eyes.  Itchy eyes.  Burning feeling in your eyes.  Eye redness.  Swollen eyelids.  Blurred vision.  How is this diagnosed?  This condition is diagnosed based on your symptoms and medical history. Your health care provider may also take a sample of discharge from your eye to find the cause of your infection. This is rarely done.  How is this treated?  This condition may be treated with:  Antibiotic eye drops or ointment to clear the infection more quickly and prevent the spread of infection to others.  Oral antibiotic medicines to treat infections that do not respond to drops or ointments or that last longer than 10 days.  Cool, wet cloths (cool compresses) placed on the eyes.  Artificial tears applied 2-6 times a day.  Follow these instructions at home:  Medicines  Take or apply your antibiotic medicine as told by your health care provider. Do not stop taking or  applying the antibiotic even if you start to feel better.  Take or apply over-the-counter and prescription medicines only as told by your health care provider.  Be very careful to avoid touching the edge of your eyelid with the eye-drop bottle or the ointment tube when you apply medicines to the affected eye. This will keep you from spreading the infection to your other eye or to other people.  Managing discomfort  Gently wipe away any drainage from your eye with a warm, wet washcloth or a cotton ball.  Apply a clean, cool compress to your eye for 10-20 minutes, 3-4 times a day.  General instructions  Do not wear contact lenses until the inflammation is gone and your health care provider says it is safe to wear them again. Ask your health care provider how to sterilize or replace your contact lenses before you use them again. Wear glasses until you can resume wearing contact lenses.  Avoid wearing eye makeup until the inflammation is gone. Throw away any old eye cosmetics that may be contaminated.  Change or wash your pillowcase every day.  Do not share towels or washcloths. This may spread the infection.  Wash your hands often with soap and water. Use paper towels to dry your hands.  Avoid touching or rubbing your eyes.  Do not drive or use heavy machinery if your vision is blurred.  Contact a health care provider if:  You have a fever.  Your symptoms do not get better after 10 days.  Get help right away if you have:  A fever and your symptoms suddenly get worse.  Severe pain when you move your eye.  Facial pain, redness, or swelling.  Sudden loss of vision.  Summary  Bacterial conjunctivitis is an infection of the clear membrane that covers the white part of your eye and the inner surface of your eyelid (conjunctiva).  Bacterial conjunctivitis spreads very easily from person to person (is contagious).  Wash your hands often with soap and water. Use paper towels to dry your hands.  Take or apply your antibiotic  medicine as told by your health care provider. Do not stop taking or applying the antibiotic even if you start to feel better.  Contact a health care provider if you have a fever or your symptoms do not get better after 10 days.  This information is not intended to replace advice given to you by your health care provider. Make sure you discuss any questions you have with your health care provider.  Document Revised: 04/07/2020 Document Reviewed: 07/24/2019  [employer] Patient Education © 2021 [employer]

## 2022-04-05 NOTE — PROGRESS NOTES
You have chosen to receive care through a telehealth visit.  Do you consent to use a video/audio connection for your medical care today? Yes     CHIEF COMPLAINT  No chief complaint on file.        HPI  Mala Kohli is a 22 y.o. female  presents with complaint of 1 week history of sore throat, cough, runny nose, headache, facial pressure/pain,  nasal congestion with greenish discharge, productive cough in morning, dry cough in afternoon; right eye is bloodshot and crusty.   Denies fever, body aches, chills, headache, wheezing, shortness of breath    COVID test was negative    Review of Systems   Constitutional: Negative for fever.   HENT: Positive for congestion, postnasal drip, rhinorrhea, sinus pressure, sinus pain and sore throat. Negative for ear pain.    Respiratory: Positive for cough. Negative for chest tightness, shortness of breath and wheezing.    All other systems reviewed and are negative.      Past Medical History:   Diagnosis Date   • Allergic rhinitis    • GERD (gastroesophageal reflux disease)    • Migraine headache    • Pain in joint of left knee    • Scoliosis    • Sinusitis    • Underweight        Family History   Problem Relation Age of Onset   • Skin cancer Mother    • Allergies Mother    • Other Mother         ALLERGIES   • Colon cancer Father    • Allergies Father    • Other Father         ALLERGIES       Social History     Socioeconomic History   • Marital status: Single   Tobacco Use   • Smoking status: Never Smoker   • Smokeless tobacco: Never Used   Vaping Use   • Vaping Use: Never used   Substance and Sexual Activity   • Alcohol use: Never   • Drug use: Never       Mala Kohli  reports that she has never smoked. She has never used smokeless tobacco..              There were no vitals taken for this visit.    PHYSICAL EXAM  Physical Exam   Constitutional: She is oriented to person, place, and time. She appears well-developed and well-nourished. She does not have a sickly  appearance. She does not appear ill.   HENT:   Head: Normocephalic and atraumatic.   Pulmonary/Chest: Effort normal.  No respiratory distress (persistent cough during visit).  Neurological: She is alert and oriented to person, place, and time.         Diagnoses and all orders for this visit:    1. Acute pansinusitis, recurrence not specified (Primary)  -     doxycycline (MONODOX) 100 MG capsule; Take 1 capsule by mouth 2 (Two) Times a Day for 10 days.  Dispense: 20 capsule; Refill: 0  -     methylPREDNISolone (MEDROL) 4 MG dose pack; Take as directed on package instructions.  Dispense: 21 tablet; Refill: 0  -     benzonatate (TESSALON) 200 MG capsule; Take 1 capsule by mouth 3 (Three) Times a Day As Needed for Cough.  Dispense: 21 capsule; Refill: 0    2. Acute bacterial conjunctivitis of right eye  -     tobramycin (Tobrex) 0.3 % solution ophthalmic solution; Administer 2 drops to the right eye Every 8 (Eight) Hours for 7 days.  Dispense: 3 mL; Refill: 0    --take medications as prescribed  --increase fluids; rest; tylenol or ibuprofen for pain  --f/u in 5-7 days if no improvement      FOLLOW-UP  As discussed during visit with PCP/[facility] if no improvement or Urgent Care/Emergency Department if worsening of symptoms    Patient verbalizes understanding of medication dosage, comfort measures, instructions for treatment and follow-up.    KRISTINE Odom  04/05/2022  12:55 EDT    This visit was performed via Telehealth.  This patient has been instructed to follow-up with their primary care provider if their symptoms worsen or the treatment provided does not resolve their illness.

## 2022-04-14 ENCOUNTER — APPOINTMENT (OUTPATIENT)
Dept: CT IMAGING | Facility: HOSPITAL | Age: 22
End: 2022-04-14

## 2022-04-14 ENCOUNTER — HOSPITAL ENCOUNTER (EMERGENCY)
Facility: HOSPITAL | Age: 22
Discharge: HOME | End: 2022-04-14
Attending: EMERGENCY MEDICINE | Admitting: EMERGENCY MEDICINE

## 2022-04-14 VITALS
WEIGHT: 163.9 LBS | TEMPERATURE: 98 F | DIASTOLIC BLOOD PRESSURE: 78 MMHG | HEART RATE: 83 BPM | RESPIRATION RATE: 16 BRPM | SYSTOLIC BLOOD PRESSURE: 120 MMHG | BODY MASS INDEX: 26.34 KG/M2 | HEIGHT: 66 IN | OXYGEN SATURATION: 98 %
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
## 2022-04-14 DIAGNOSIS — S09.90XA INJURY OF HEAD, INITIAL ENCOUNTER: ICD-10-CM

## 2022-04-14 LAB — B-HCG UR QL: NEGATIVE

## 2022-04-14 PROCEDURE — 70450 CT HEAD/BRAIN W/O DYE: CPT

## 2022-04-14 PROCEDURE — 81025 URINE PREGNANCY TEST: CPT | Performed by: NURSE PRACTITIONER

## 2022-04-14 PROCEDURE — 99283 EMERGENCY DEPT VISIT LOW MDM: CPT

## 2022-04-14 RX ORDER — METHOCARBAMOL 750 MG/1
750 TABLET, FILM COATED ORAL 3 TIMES DAILY PRN
Qty: 10 TABLET | Refills: 0 | Status: SHIPPED | OUTPATIENT
Start: 2022-04-14 | End: 2022-04-19

## 2022-04-14 NOTE — DISCHARGE INSTRUCTIONS
Please call your primary care provider to schedule follow-up appointment and clearance for your drill for the   Return to the ED for new or worsening symptoms
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
## 2022-04-19 ENCOUNTER — OFFICE VISIT (OUTPATIENT)
Dept: FAMILY MEDICINE CLINIC | Facility: CLINIC | Age: 22
End: 2022-04-19

## 2022-04-19 VITALS
HEIGHT: 65 IN | OXYGEN SATURATION: 97 % | BODY MASS INDEX: 26.99 KG/M2 | DIASTOLIC BLOOD PRESSURE: 79 MMHG | TEMPERATURE: 97.7 F | SYSTOLIC BLOOD PRESSURE: 127 MMHG | RESPIRATION RATE: 16 BRPM | WEIGHT: 162 LBS | HEART RATE: 81 BPM
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
## 2022-04-19 DIAGNOSIS — S00.83XD CONTUSION OF OTHER PART OF HEAD, SUBSEQUENT ENCOUNTER: ICD-10-CM

## 2022-04-19 DIAGNOSIS — S16.1XXD STRAIN OF NECK MUSCLE, SUBSEQUENT ENCOUNTER: ICD-10-CM

## 2022-04-19 PROCEDURE — 99213 OFFICE O/P EST LOW 20 MIN: CPT | Performed by: NURSE PRACTITIONER
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
## 2022-06-02 ENCOUNTER — OFFICE VISIT (OUTPATIENT)
Dept: PODIATRY | Facility: CLINIC | Age: 22
End: 2022-06-02

## 2022-06-02 VITALS
BODY MASS INDEX: 27.66 KG/M2 | WEIGHT: 162 LBS | HEIGHT: 64 IN | DIASTOLIC BLOOD PRESSURE: 75 MMHG | SYSTOLIC BLOOD PRESSURE: 115 MMHG | HEART RATE: 76 BPM

## 2022-06-02 DIAGNOSIS — M25.871 ANKLE IMPINGEMENT SYNDROME, RIGHT: ICD-10-CM

## 2022-06-02 DIAGNOSIS — M25.571 ACUTE RIGHT ANKLE PAIN: Primary | ICD-10-CM

## 2022-06-02 PROCEDURE — 20606 DRAIN/INJ JOINT/BURSA W/US: CPT | Performed by: PODIATRIST

## 2022-06-02 PROCEDURE — 99213 OFFICE O/P EST LOW 20 MIN: CPT | Performed by: PODIATRIST

## 2022-06-02 RX ORDER — TRIAMCINOLONE ACETONIDE 40 MG/ML
20 INJECTION, SUSPENSION INTRA-ARTICULAR; INTRAMUSCULAR ONCE
Status: DISCONTINUED | OUTPATIENT
Start: 2022-06-02 | End: 2022-06-29

## 2022-06-24 RX ORDER — TRIAMCINOLONE ACETONIDE 40 MG/ML
20 INJECTION, SUSPENSION INTRA-ARTICULAR; INTRAMUSCULAR ONCE
Status: COMPLETED | OUTPATIENT
Start: 2022-06-24 | End: 2022-06-29

## 2022-06-29 ENCOUNTER — OFFICE VISIT (OUTPATIENT)
Dept: PODIATRY | Facility: CLINIC | Age: 22
End: 2022-06-29

## 2022-06-29 VITALS
HEART RATE: 87 BPM | WEIGHT: 162 LBS | DIASTOLIC BLOOD PRESSURE: 76 MMHG | HEIGHT: 64 IN | SYSTOLIC BLOOD PRESSURE: 115 MMHG | BODY MASS INDEX: 27.66 KG/M2

## 2022-06-29 DIAGNOSIS — M25.571 ACUTE RIGHT ANKLE PAIN: Primary | ICD-10-CM

## 2022-06-29 DIAGNOSIS — M25.871 ANKLE IMPINGEMENT SYNDROME, RIGHT: ICD-10-CM

## 2022-06-29 PROCEDURE — 99214 OFFICE O/P EST MOD 30 MIN: CPT | Performed by: PODIATRIST

## 2022-06-29 RX ADMIN — TRIAMCINOLONE ACETONIDE 20 MG: 40 INJECTION, SUSPENSION INTRA-ARTICULAR; INTRAMUSCULAR at 15:39

## 2022-06-29 NOTE — PROGRESS NOTES
"06/29/2022  Foot and Ankle Surgery - Established Patient/Follow-up  Provider: Dr. Florentino Oconnor DPM  Location: [facility]    Subjective:  Mala Kohli is a 22 y.o. female.     Chief Complaint   Patient presents with   • Right Ankle - Follow-up   • Follow-up     Last visit PCP TIM CARMONA  04/20/2022 approx.       HPI:     The patient is a 22-year-old female who presents to the clinic for a follow-up regarding her right ankle.    She states her right ankle seemed to improve for approximately 1 week; however, after that time it has had more pain and continues to make a popping sound. She reports her pain is constant, but denies feeling as if her ankle was going to collapse on her. 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 The patient states it has not felt normal since that time. She states he is working as a ; however, is more concerned with her duties in the [employer]. She accepts she will need to have surgery performed.    Allergies   Allergen Reactions   • Penicillin G Hives       Current Outpatient Medications on File Prior to Visit   Medication Sig Dispense Refill   • ESTRADIOL-NORGESTIMATE PO Take 1 tablet by mouth Daily.     • naproxen (NAPROSYN) 500 MG tablet TAKE 1 TABLET BY MOUTH TWICE A DAY WITH MEALS 60 tablet 1     Current Facility-Administered Medications on File Prior to Visit   Medication Dose Route Frequency Provider Last Rate Last Admin   • [COMPLETED] triamcinolone acetonide (KENALOG-40) injection 20 mg  20 mg Intra-articular Once EDENILSON Oconnor DPM   20 mg at 06/29/22 1539   • [DISCONTINUED] triamcinolone acetonide (KENALOG-40) injection 20 mg  20 mg Intra-articular Once EDENILSON Oconnor DPM           Objective   /76   Pulse 87   Ht 162.6 cm (64\")   Wt 73.5 kg (162 lb)   BMI 27.81 kg/m²     Foot/Ankle Exam:       General:   Appearance: appears stated age and healthy    Orientation: AAOx3  "   Affect: appropriate    Gait: unimpaired      VASCULAR      Right Foot Vascularity   Normal vascular exam    Dorsalis pedis:  2+  Posterior tibial:  2+  Skin Temperature: warm    Edema Grading:  None  CFT:  < 3 seconds  Pedal Hair Growth:  Present  Varicosities: none        NEUROLOGIC     Right Foot Neurologic   Light touch sensation:  Normal  Hot/Cold sensation: normal    Achilles reflex:  2+     MUSCULOSKELETAL      Right Foot Musculoskeletal   Ecchymosis:  None  Tenderness: none    Arch:  Normal     MUSCLE STRENGTH     Right Foot Muscle Strength   Normal strength    Foot dorsiflexion:  5  Foot plantar flexion:  5  Foot inversion:  5  Foot eversion:  5     DERMATOLOGIC     Right Foot Dermatologic   Skin: skin intact       TESTS     Right Foot Tests   Anterior drawer: negative    Varus tilt: negative        Right Foot Additional Comments Continued pain and instability noted.      Assessment & Plan   Diagnoses and all orders for this visit:    1. Acute right ankle pain (Primary)    2. Ankle impingement syndrome, right  -     COVID PRE-OP / PRE-PROCEDURE SCREENING ORDER (NO ISOLATION) - Swab, Nasopharynx; Future  -     CBC (No Diff); Future  -     Basic Metabolic Panel; Future  -     ECG 12 Lead; Future  -     XR Chest 2 View; Future  -     Case Request; Standing  -     Case Request    Other orders  -     Follow Anesthesia Guidelines / Standing Orders; Future  -     Obtain Informed Consent; Future  -     Provide NPO Instructions to Patient; Future  -     Chlorhexidine Skin Prep; Future      The patient presents to the clinic for a follow up of her right ankle. Her MRI has been reviewed.  Patient has not responded to conservative measures.  She is only noticed mild improvement after previous steroid injection and continues to have pain and swelling.  She states that most of the issues are going down stairs.  I continue to feel that she is dealing with impingement syndrome.  I have recommended right ankle arthroscopy,  which she agrees to. We will proceed with completing her [place] paperwork after scheduling schedules her surgery. Procedure, risks, goals, and recovery discussed with patient at length.  All questions were answered to patient's satisfaction.  We will plan for the near future.  Greater than 30 minutes spent before and after evaluation for patient care.    Orders Placed This Encounter   Procedures   • COVID PRE-OP / PRE-PROCEDURE SCREENING ORDER (NO ISOLATION) - Swab, Nasopharynx     Standing Status:   Future     Standing Expiration Date:   6/30/2023     Order Specific Question:   Please select your location:     Answer:    [place]     Order Specific Question:   COVID Screening Order:     Answer:   In-House: EMERGENT/PREOP-CEPHEID, 3-4 HR TAT [[id number]]     Order Specific Question:   Previously tested for COVID-19?     Answer:   Unknown     Order Specific Question:   Employed in healthcare setting?     Answer:   No     Order Specific Question:   Symptomatic for COVID-19 as defined by CDC?     Answer:   No     Order Specific Question:   Hospitalized for COVID-19?     Answer:   No     Order Specific Question:   Admitted to ICU for COVID-19?     Answer:   No     Order Specific Question:   Resident in a congregate (group) care setting?     Answer:   No     Order Specific Question:   Pregnant?     Answer:   Unknown     Order Specific Question:   Release to patient     Answer:   Immediate   • XR Chest 2 View     Standing Status:   Future     Standing Expiration Date:   6/30/2023     Order Specific Question:   Reason for Exam:     Answer:   Preop     Order Specific Question:   Patient Pregnant     Answer:   Unknown   • CBC (No Diff)     Standing Status:   Future     Standing Expiration Date:   6/30/2023     Order Specific Question:   Release to patient     Answer:   Immediate   • Basic Metabolic Panel     Standing Status:   Future     Standing Expiration Date:   6/30/2023     Order Specific Question:   Release to patient      Answer:   Immediate   • Follow Anesthesia Guidelines / Standing Orders     Standing Status:   Future   • Obtain Informed Consent     Standing Status:   Future     Order Specific Question:   Informed Consent Given For     Answer:   Ankle arthroscopy with evaluation and debridement to the right lower extremity   • Provide NPO Instructions to Patient     Standing Status:   Future   • Chlorhexidine Skin Prep     Chlorhexidine Skin Prep and Instructions For All Patients Having A Procedure Requiring an Outward Incision if Not Allergic. If Allergic, Give Antibacterial Skin Wipes and Instructions. Do Not Use For Facial Cases or on Any Mucus Membranes.     Standing Status:   Future   • ECG 12 Lead     Standing Status:   Future     Standing Expiration Date:   6/30/2023     Order Specific Question:   Reason for Exam:     Answer:   Preop          Note is dictated utilizing voice recognition software. Unfortunately this leads to occasional typographical errors. I apologize in advance if the situation occurs. If questions occur please do not hesitate to call our office.    Transcribed from ambient dictation for EDENILSON Oconnor DPM by Jack Farris.  06/29/22   08:52 EDT    Patient verbalized consent to the visit recording.

## 2022-06-29 NOTE — ADDENDUM NOTE
Addended by: STEVAN LORENZ on: 6/29/2022 03:41 PM     Modules accepted: Orders     Clindamycin Counseling: I counseled the patient regarding use of clindamycin as an antibiotic for prophylactic and/or therapeutic purposes. Clindamycin is active against numerous classes of bacteria, including skin bacteria. Side effects may include nausea, diarrhea, gastrointestinal upset, rash, hives, yeast infections, and in rare cases, colitis.

## 2022-07-06 PROBLEM — M25.871 ANKLE IMPINGEMENT SYNDROME, RIGHT: Status: ACTIVE | Noted: 2022-07-06

## 2022-07-06 RX ORDER — LORATADINE 10 MG/1
10 CAPSULE, LIQUID FILLED ORAL NIGHTLY
COMMUNITY
End: 2023-03-31 | Stop reason: SDUPTHER

## 2022-07-08 ENCOUNTER — HOSPITAL ENCOUNTER (OUTPATIENT)
Dept: CARDIOLOGY | Facility: HOSPITAL | Age: 22
Discharge: HOME | End: 2022-07-08

## 2022-07-08 ENCOUNTER — ANESTHESIA EVENT (OUTPATIENT)
Dept: PERIOP | Facility: HOSPITAL | Age: 22
End: 2022-07-08

## 2022-07-08 ENCOUNTER — HOSPITAL ENCOUNTER (OUTPATIENT)
Dept: GENERAL RADIOLOGY | Facility: HOSPITAL | Age: 22
Discharge: HOME | End: 2022-07-08

## 2022-07-08 ENCOUNTER — LAB (OUTPATIENT)
Dept: LAB | Facility: HOSPITAL | Age: 22
End: 2022-07-08

## 2022-07-08 DIAGNOSIS — M25.871 ANKLE IMPINGEMENT SYNDROME, RIGHT: ICD-10-CM

## 2022-07-08 DIAGNOSIS — Z01.818 PREOP TESTING: Primary | ICD-10-CM

## 2022-07-08 LAB
ANION GAP SERPL CALCULATED.3IONS-SCNC: 10.9 MMOL/L (ref 5–15)
BUN SERPL-MCNC: 6 MG/DL (ref 6–20)
BUN/CREAT SERPL: 12.5 (ref 7–25)
CALCIUM SPEC-SCNC: 9.3 MG/DL (ref 8.6–10.5)
CHLORIDE SERPL-SCNC: 102 MMOL/L (ref 98–107)
CO2 SERPL-SCNC: 26.1 MMOL/L (ref 22–29)
CREAT SERPL-MCNC: 0.48 MG/DL (ref 0.57–1)
DEPRECATED RDW RBC AUTO: 37 FL (ref 37–54)
EGFRCR SERPLBLD CKD-EPI 2021: 138.4 ML/MIN/1.73
ERYTHROCYTE [DISTWIDTH] IN BLOOD BY AUTOMATED COUNT: 12 % (ref 12.3–15.4)
GLUCOSE SERPL-MCNC: 72 MG/DL (ref 65–99)
HCT VFR BLD AUTO: 38.1 % (ref 34–46.6)
HGB BLD-MCNC: 12.7 G/DL (ref 12–15.9)
MCH RBC QN AUTO: 28.5 PG (ref 26.6–33)
MCHC RBC AUTO-ENTMCNC: 33.3 G/DL (ref 31.5–35.7)
MCV RBC AUTO: 85.6 FL (ref 79–97)
PLATELET # BLD AUTO: 395 10*3/MM3 (ref 140–450)
PMV BLD AUTO: 10.2 FL (ref 6–12)
POTASSIUM SERPL-SCNC: 3.4 MMOL/L (ref 3.5–5.2)
RBC # BLD AUTO: 4.45 10*6/MM3 (ref 3.77–5.28)
SODIUM SERPL-SCNC: 139 MMOL/L (ref 136–145)
WBC NRBC COR # BLD: 8.85 10*3/MM3 (ref 3.4–10.8)

## 2022-07-08 PROCEDURE — 71046 X-RAY EXAM CHEST 2 VIEWS: CPT

## 2022-07-08 PROCEDURE — U0004 COV-19 TEST NON-CDC HGH THRU: HCPCS

## 2022-07-08 PROCEDURE — C9803 HOPD COVID-19 SPEC COLLECT: HCPCS

## 2022-07-08 PROCEDURE — 93005 ELECTROCARDIOGRAM TRACING: CPT | Performed by: PODIATRIST

## 2022-07-08 PROCEDURE — 85027 COMPLETE CBC AUTOMATED: CPT

## 2022-07-08 PROCEDURE — 93010 ELECTROCARDIOGRAM REPORT: CPT | Performed by: INTERNAL MEDICINE

## 2022-07-08 PROCEDURE — 36415 COLL VENOUS BLD VENIPUNCTURE: CPT

## 2022-07-08 PROCEDURE — 80048 BASIC METABOLIC PNL TOTAL CA: CPT

## 2022-07-09 LAB — SARS-COV-2 ORF1AB RESP QL NAA+PROBE: NOT DETECTED

## 2022-07-10 NOTE — ANESTHESIA PREPROCEDURE EVALUATION
Anesthesia Evaluation     Patient summary reviewed and Nursing notes reviewed   NPO Solid Status: > 8 hours  NPO Liquid Status: > 8 hours           Airway   Dental      Pulmonary    Cardiovascular     ECG reviewed        Neuro/Psych  (+) headaches,    GI/Hepatic/Renal/Endo    (+)  GERD,      Musculoskeletal     Abdominal    Substance History      OB/GYN          Other        ROS/Med Hx Other: Ankle impingement, hypokalemia, allergies, migraines, scoliosis, knee pain                Anesthesia Plan    ASA 2     general     (LMA    atient identified; pre-operative vital signs, all relevant labs/studies, complete medical/surgical/anesthetic history, full medication list, full allergy list, and NPO status obtained/reviewed; physical assessment performed; anesthetic options, side effects, potential complications, risks, and benefits discussed; questions answered; written anesthesia consent obtained; patient cleared for procedure; anesthesia machine and equipment checked and functioning)  intravenous induction     Anesthetic plan, risks, benefits, and alternatives have been provided, discussed and informed consent has been obtained with: patient.    Plan discussed with CRNA and CAA.        CODE STATUS:

## 2022-07-11 ENCOUNTER — ANESTHESIA (OUTPATIENT)
Dept: PERIOP | Facility: HOSPITAL | Age: 22
End: 2022-07-11

## 2022-07-13 ENCOUNTER — LAB (OUTPATIENT)
Dept: LAB | Facility: HOSPITAL | Age: 22
End: 2022-07-13

## 2022-07-13 LAB
QT INTERVAL: 386 MS
SARS-COV-2 ORF1AB RESP QL NAA+PROBE: NOT DETECTED

## 2022-07-13 PROCEDURE — U0004 COV-19 TEST NON-CDC HGH THRU: HCPCS

## 2022-07-13 PROCEDURE — C9803 HOPD COVID-19 SPEC COLLECT: HCPCS

## 2022-07-15 ENCOUNTER — HOSPITAL ENCOUNTER (OUTPATIENT)
Facility: HOSPITAL | Age: 22
Setting detail: HOSPITAL OUTPATIENT SURGERY
Discharge: HOME | End: 2022-07-15
Attending: PODIATRIST | Admitting: PODIATRIST

## 2022-07-15 VITALS
HEIGHT: 66 IN | WEIGHT: 160.6 LBS | BODY MASS INDEX: 25.81 KG/M2 | TEMPERATURE: 98.2 F | RESPIRATION RATE: 16 BRPM | DIASTOLIC BLOOD PRESSURE: 59 MMHG | HEART RATE: 84 BPM | OXYGEN SATURATION: 98 % | SYSTOLIC BLOOD PRESSURE: 121 MMHG

## 2022-07-15 DIAGNOSIS — M25.871 ANKLE IMPINGEMENT SYNDROME, RIGHT: ICD-10-CM

## 2022-07-15 LAB
B-HCG UR QL: NEGATIVE
POTASSIUM SERPL-SCNC: 4.2 MMOL/L (ref 3.5–5.2)

## 2022-07-15 PROCEDURE — 25010000002 FENTANYL CITRATE (PF) 50 MCG/ML SOLUTION: Performed by: NURSE ANESTHETIST, CERTIFIED REGISTERED

## 2022-07-15 PROCEDURE — 25010000002 KETOROLAC TROMETHAMINE PER 15 MG: Performed by: NURSE ANESTHETIST, CERTIFIED REGISTERED

## 2022-07-15 PROCEDURE — 25010000002 PROPOFOL 10 MG/ML EMULSION: Performed by: NURSE ANESTHETIST, CERTIFIED REGISTERED

## 2022-07-15 PROCEDURE — 25010000002 DEXAMETHASONE PER 1 MG: Performed by: NURSE ANESTHETIST, CERTIFIED REGISTERED

## 2022-07-15 PROCEDURE — 84132 ASSAY OF SERUM POTASSIUM: CPT | Performed by: PODIATRIST

## 2022-07-15 PROCEDURE — 81025 URINE PREGNANCY TEST: CPT | Performed by: ANESTHESIOLOGY

## 2022-07-15 PROCEDURE — 25010000002 ONDANSETRON PER 1 MG: Performed by: NURSE ANESTHETIST, CERTIFIED REGISTERED

## 2022-07-15 PROCEDURE — 29897 ANKLE ARTHROSCOPY/SURGERY: CPT | Performed by: PODIATRIST

## 2022-07-15 RX ORDER — ONDANSETRON 2 MG/ML
INJECTION INTRAMUSCULAR; INTRAVENOUS AS NEEDED
Status: DISCONTINUED | OUTPATIENT
Start: 2022-07-15 | End: 2022-07-15 | Stop reason: SURG

## 2022-07-15 RX ORDER — EPHEDRINE SULFATE 5 MG/ML
INJECTION INTRAVENOUS AS NEEDED
Status: DISCONTINUED | OUTPATIENT
Start: 2022-07-15 | End: 2022-07-15 | Stop reason: SURG

## 2022-07-15 RX ORDER — SODIUM CHLORIDE, SODIUM LACTATE, POTASSIUM CHLORIDE, CALCIUM CHLORIDE 600; 310; 30; 20 MG/100ML; MG/100ML; MG/100ML; MG/100ML
INJECTION, SOLUTION INTRAVENOUS CONTINUOUS PRN
Status: DISCONTINUED | OUTPATIENT
Start: 2022-07-15 | End: 2022-07-15 | Stop reason: SURG

## 2022-07-15 RX ORDER — HYDROCODONE BITARTRATE AND ACETAMINOPHEN 7.5; 325 MG/1; MG/1
1 TABLET ORAL ONCE AS NEEDED
Status: COMPLETED | OUTPATIENT
Start: 2022-07-15 | End: 2022-07-15

## 2022-07-15 RX ORDER — LIDOCAINE HYDROCHLORIDE 10 MG/ML
INJECTION, SOLUTION EPIDURAL; INFILTRATION; INTRACAUDAL; PERINEURAL AS NEEDED
Status: DISCONTINUED | OUTPATIENT
Start: 2022-07-15 | End: 2022-07-15 | Stop reason: SURG

## 2022-07-15 RX ORDER — BUPIVACAINE HYDROCHLORIDE 5 MG/ML
INJECTION, SOLUTION PERINEURAL AS NEEDED
Status: DISCONTINUED | OUTPATIENT
Start: 2022-07-15 | End: 2022-07-15 | Stop reason: HOSPADM

## 2022-07-15 RX ORDER — CLINDAMYCIN PHOSPHATE 900 MG/50ML
900 INJECTION, SOLUTION INTRAVENOUS ONCE
Status: COMPLETED | OUTPATIENT
Start: 2022-07-15 | End: 2022-07-15

## 2022-07-15 RX ORDER — DIPHENHYDRAMINE HYDROCHLORIDE 50 MG/ML
12.5 INJECTION INTRAMUSCULAR; INTRAVENOUS
Status: DISCONTINUED | OUTPATIENT
Start: 2022-07-15 | End: 2022-07-15 | Stop reason: HOSPADM

## 2022-07-15 RX ORDER — DEXAMETHASONE SODIUM PHOSPHATE 4 MG/ML
INJECTION, SOLUTION INTRA-ARTICULAR; INTRALESIONAL; INTRAMUSCULAR; INTRAVENOUS; SOFT TISSUE AS NEEDED
Status: DISCONTINUED | OUTPATIENT
Start: 2022-07-15 | End: 2022-07-15 | Stop reason: SURG

## 2022-07-15 RX ORDER — HYDROCODONE BITARTRATE AND ACETAMINOPHEN 7.5; 325 MG/1; MG/1
1 TABLET ORAL EVERY 6 HOURS PRN
Qty: 28 TABLET | Refills: 0 | Status: SHIPPED | OUTPATIENT
Start: 2022-07-15 | End: 2022-08-31

## 2022-07-15 RX ORDER — ONDANSETRON 2 MG/ML
4 INJECTION INTRAMUSCULAR; INTRAVENOUS ONCE AS NEEDED
Status: DISCONTINUED | OUTPATIENT
Start: 2022-07-15 | End: 2022-07-15 | Stop reason: HOSPADM

## 2022-07-15 RX ORDER — FLUMAZENIL 0.1 MG/ML
0.2 INJECTION INTRAVENOUS AS NEEDED
Status: DISCONTINUED | OUTPATIENT
Start: 2022-07-15 | End: 2022-07-15 | Stop reason: HOSPADM

## 2022-07-15 RX ORDER — MIDAZOLAM HYDROCHLORIDE 1 MG/ML
1 INJECTION INTRAMUSCULAR; INTRAVENOUS
Status: DISCONTINUED | OUTPATIENT
Start: 2022-07-15 | End: 2022-07-15 | Stop reason: HOSPADM

## 2022-07-15 RX ORDER — ALBUTEROL SULFATE 2.5 MG/3ML
2.5 SOLUTION RESPIRATORY (INHALATION) ONCE AS NEEDED
Status: DISCONTINUED | OUTPATIENT
Start: 2022-07-15 | End: 2022-07-15 | Stop reason: HOSPADM

## 2022-07-15 RX ORDER — KETOROLAC TROMETHAMINE 30 MG/ML
INJECTION, SOLUTION INTRAMUSCULAR; INTRAVENOUS AS NEEDED
Status: DISCONTINUED | OUTPATIENT
Start: 2022-07-15 | End: 2022-07-15 | Stop reason: SURG

## 2022-07-15 RX ORDER — SODIUM CHLORIDE, SODIUM LACTATE, POTASSIUM CHLORIDE, CALCIUM CHLORIDE 600; 310; 30; 20 MG/100ML; MG/100ML; MG/100ML; MG/100ML
9 INJECTION, SOLUTION INTRAVENOUS CONTINUOUS PRN
Status: DISCONTINUED | OUTPATIENT
Start: 2022-07-15 | End: 2022-07-15 | Stop reason: HOSPADM

## 2022-07-15 RX ORDER — FENTANYL CITRATE 50 UG/ML
INJECTION, SOLUTION INTRAMUSCULAR; INTRAVENOUS AS NEEDED
Status: DISCONTINUED | OUTPATIENT
Start: 2022-07-15 | End: 2022-07-15 | Stop reason: SURG

## 2022-07-15 RX ORDER — NALOXONE HCL 0.4 MG/ML
0.4 VIAL (ML) INJECTION AS NEEDED
Status: DISCONTINUED | OUTPATIENT
Start: 2022-07-15 | End: 2022-07-15 | Stop reason: HOSPADM

## 2022-07-15 RX ORDER — SODIUM CHLORIDE 0.9 % (FLUSH) 0.9 %
3 SYRINGE (ML) INJECTION EVERY 12 HOURS SCHEDULED
Status: DISCONTINUED | OUTPATIENT
Start: 2022-07-15 | End: 2022-07-15 | Stop reason: HOSPADM

## 2022-07-15 RX ORDER — MEPERIDINE HYDROCHLORIDE 25 MG/ML
12.5 INJECTION INTRAMUSCULAR; INTRAVENOUS; SUBCUTANEOUS
Status: DISCONTINUED | OUTPATIENT
Start: 2022-07-15 | End: 2022-07-15 | Stop reason: HOSPADM

## 2022-07-15 RX ORDER — SODIUM CHLORIDE 0.9 % (FLUSH) 0.9 %
3-10 SYRINGE (ML) INJECTION AS NEEDED
Status: DISCONTINUED | OUTPATIENT
Start: 2022-07-15 | End: 2022-07-15 | Stop reason: HOSPADM

## 2022-07-15 RX ORDER — NALBUPHINE HCL 10 MG/ML
2 AMPUL (ML) INJECTION EVERY 4 HOURS PRN
Status: DISCONTINUED | OUTPATIENT
Start: 2022-07-15 | End: 2022-07-15 | Stop reason: HOSPADM

## 2022-07-15 RX ORDER — LIDOCAINE HYDROCHLORIDE 10 MG/ML
0.5 INJECTION, SOLUTION EPIDURAL; INFILTRATION; INTRACAUDAL; PERINEURAL ONCE AS NEEDED
Status: DISCONTINUED | OUTPATIENT
Start: 2022-07-15 | End: 2022-07-15 | Stop reason: HOSPADM

## 2022-07-15 RX ORDER — PROPOFOL 10 MG/ML
VIAL (ML) INTRAVENOUS AS NEEDED
Status: DISCONTINUED | OUTPATIENT
Start: 2022-07-15 | End: 2022-07-15 | Stop reason: SURG

## 2022-07-15 RX ORDER — NALBUPHINE HCL 10 MG/ML
10 AMPUL (ML) INJECTION EVERY 4 HOURS PRN
Status: DISCONTINUED | OUTPATIENT
Start: 2022-07-15 | End: 2022-07-15 | Stop reason: HOSPADM

## 2022-07-15 RX ADMIN — LIDOCAINE HYDROCHLORIDE 50 MG: 10 INJECTION, SOLUTION EPIDURAL; INFILTRATION; INTRACAUDAL; PERINEURAL at 11:24

## 2022-07-15 RX ADMIN — EPHEDRINE SULFATE 10 MG: 5 INJECTION INTRAVENOUS at 11:30

## 2022-07-15 RX ADMIN — PROPOFOL 200 MG: 10 INJECTION, EMULSION INTRAVENOUS at 11:24

## 2022-07-15 RX ADMIN — HYDROCODONE BITARTRATE AND ACETAMINOPHEN 1 TABLET: 7.5; 325 TABLET ORAL at 13:00

## 2022-07-15 RX ADMIN — KETOROLAC TROMETHAMINE 30 MG: 30 INJECTION, SOLUTION INTRAMUSCULAR at 12:05

## 2022-07-15 RX ADMIN — ONDANSETRON 4 MG: 2 INJECTION INTRAMUSCULAR; INTRAVENOUS at 12:00

## 2022-07-15 RX ADMIN — CLINDAMYCIN PHOSPHATE 900 MG: 900 INJECTION, SOLUTION INTRAVENOUS at 11:23

## 2022-07-15 RX ADMIN — SODIUM CHLORIDE, SODIUM LACTATE, POTASSIUM CHLORIDE, AND CALCIUM CHLORIDE: .6; .31; .03; .02 INJECTION, SOLUTION INTRAVENOUS at 11:13

## 2022-07-15 RX ADMIN — DEXAMETHASONE SODIUM PHOSPHATE 4 MG: 4 INJECTION, SOLUTION INTRAMUSCULAR; INTRAVENOUS at 11:24

## 2022-07-15 RX ADMIN — FENTANYL CITRATE 100 MCG: 50 INJECTION, SOLUTION INTRAMUSCULAR; INTRAVENOUS at 11:24

## 2022-07-15 RX ADMIN — PROPOFOL 150 MG: 10 INJECTION, EMULSION INTRAVENOUS at 11:25

## 2022-07-15 RX ADMIN — SODIUM CHLORIDE, POTASSIUM CHLORIDE, SODIUM LACTATE AND CALCIUM CHLORIDE 9 ML/HR: 600; 310; 30; 20 INJECTION, SOLUTION INTRAVENOUS at 09:40

## 2022-07-15 NOTE — ANESTHESIA POSTPROCEDURE EVALUATION
Patient: Mala Kohli    Procedure Summary     Date: 07/15/22 Room / Location: [facility] OR 02 / [facility] MAIN OR    Anesthesia Start: 1113 Anesthesia Stop: 1221    Procedure: ANKLE ARTHROSCOPY (Right Ankle) Diagnosis:       Ankle impingement syndrome, right      (Ankle impingement syndrome, right [M25.871])    Surgeons: EDENILSON Oconnor DPM Provider: Charles Williamson MD    Anesthesia Type: general ASA Status: 2          Anesthesia Type: general    Vitals  Vitals Value Taken Time   /57 07/15/22 1311   Temp 97.3 °F (36.3 °C) 07/15/22 1311   Pulse 83 07/15/22 1313   Resp 15 07/15/22 1311   SpO2 100 % 07/15/22 1313   Vitals shown include unvalidated device data.        Post Anesthesia Care and Evaluation    Patient location during evaluation: PACU  Patient participation: complete - patient participated  Level of consciousness: awake  Pain scale: See nurse's notes for pain score.  Pain management: adequate    Airway patency: patent  Anesthetic complications: No anesthetic complications  PONV Status: none  Cardiovascular status: acceptable  Respiratory status: acceptable  Hydration status: acceptable    Comments: Patient seen and examined postoperatively; vital signs stable; SpO2 greater than or equal to 90%; cardiopulmonary status stable; nausea/vomiting adequately controlled; pain adequately controlled; no apparent anesthesia complications; patient discharged from anesthesia care when discharge criteria were met

## 2022-07-15 NOTE — ANESTHESIA PROCEDURE NOTES
Airway  Urgency: elective    Date/Time: 7/15/2022 11:25 AM  Airway not difficult    General Information and Staff    Patient location during procedure: OR  Anesthesiologist: Charles Williamson MD  CRNA/CAA: Abril Mckeon CRNA    Indications and Patient Condition  Indications for airway management: airway protection    Preoxygenated: yes  MILS maintained throughout  Mask difficulty assessment: 1 - vent by mask    Final Airway Details  Final airway type: supraglottic airway      Successful airway: classic  Size 4    Number of attempts at approach: 1  Assessment: lips, teeth, and gum same as pre-op and atraumatic intubation

## 2022-07-15 NOTE — OP NOTE
Operative Note   Foot and Ankle Surgery   Provider: Dr. Florentino Oconnor   Location: [facility]      Procedure:  1.  Ankle arthroscopy with limited debridement, right ankle    Pre-operative Diagnosis:   1.  Chronic ankle pain, right  2.  Anterior lateral ankle impingement, right    Post-operative Diagnosis: Same    Surgeon: Florentino Oconnor    Assistant: Aneesh Packer PGY2    Anesthesia: General    Implants: None    Findings: Mild hemorrhagic and chronic synovitis involving the anterolateral aspect of the ankle.  No maria d meniscoid bodies, OCD lesions, or other degenerative changes    Specimen: None    Blood Loss: Less than 5cc    Complications: None    Post Op Plan: Discharge home.  Partial weightbearing activity as needed in cam boot.  Follow-up with me in 2 weeks    Summary:    Patient is a 22-year-old female that has been seen in office for chronic ankle pain.  She has had plain film and MRI images due to failed conservative care.  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  I did discuss this with her at length in office.  Given she has tried and failed conservative treatments and remains symptomatic, I do feel the most appropriate option is to proceed with ankle arthroscopy.  Patient understands that risks of continued pain and additional surgeries are possible.    Procedure, risks, complications, and goals were discussed with the patient at bedside.  Risks include but are not limited to infection, complications from anesthesia (including death), chronic pain or numbness, hematoma/seroma, deep vein thrombosis, wound complications, and potential for additional surgical procedures.  Patient understands and elects to proceed with surgery at this time. Informed consent was obtained before proceeding to the operating suite.  All questions were answered to the patient's satisfaction. No guarantees or assurances were given or implied.    Procedure:     Patient was brought to the operating room  and placed on the operative table in supine position.  Once adequate general anesthesia was administered, a pneumatic tourniquet was placed about the patient's operative thigh.  The lower extremity was positioned, scrubbed, and prepped in the standard fashion.  The limb was elevated exsanguinated and the pneumatic tourniquet was inflated to 300 mm of mercury.  A formal time-out was conducted prior skin incision.    Attention was directed to the anterior aspect of the ankle.  The anterior medial portal was identified medial to the tibialis anterior tendon. A small stab incision was performed with blunt dissection to the level of the joint. The joint was insufflated with approximately 15 cc of normal saline.  The cannula and trocar were introduced into the ankle followed by the arthroscope.  The joint was fully evaluated obtaining pictures throughout the case.  Mild chronic and hemorrhagic synovitis was noted to the anterior medial and anterior lateral aspects of the ankle.  No obvious osteochondral defects were present.  The anterior lateral portal was obtained under direct visualization.  Again a stab incision with blunt dissection was performed to the level of the capsule.  The trocar was introduced followed by a 3 mm shaver.  The synovitis was removed with thus decreasing the impingement with ankle dorsiflexion.  Final images were obtained.  The ankle joint was irrigated with copious amounts of normal saline.  All instrumentation was removed and the ankle was decompressed.  The medial stab incision was closed with 3-0 nylon in a simple interrupted manner.  Patient tolerated the procedure anesthesia well.  Incision sites were closed with Xeroform and sterile compressive dressings.  The tourniquet was released and a prompt hyperemic response was noted to all digits of the right foot.  Patient tolerated the procedure and anesthesia well.  She was transferred from the operating room to the recovery room with vital  signs stable and neuro vas status unchanged to the right lower extremity.      Dr. Florentino Oconnor, DPM  [facility]  [phone]    Note is dictated utilizing voice recognition software. Unfortunately this leads to occasional typographical errors. I apologize in advance if the situation occurs. If questions occur please do not hesitate to call our office.

## 2022-07-21 ENCOUNTER — TELEPHONE (OUTPATIENT)
Dept: PODIATRY | Facility: CLINIC | Age: 22
End: 2022-07-21

## 2022-07-21 NOTE — TELEPHONE ENCOUNTER
DR MORAN PATIENT: RE: PAPERWORK 07/21/2022 PATIENT IS IN [employer] AND HAS DRILL, THIS NEXT WEEK AND CALLED TO CHECK STATUS OF HER PAPERWORK, NEEDS TO TURN IT IN PRIOR TO HER DRILL.  ATTEMPTED TO WARM TRANSFER TO [facility] (AS PER PATIENT THAT IS WHO HAS PAPERWORK).  HUB ADVISED ADVISED PATIENT WOULD NOTIFY [facility], TO GIVE HER A CALL WITH AN UPDATE OR ETA IT WILL BE READY FOR PICKUP.

## 2022-07-28 ENCOUNTER — OFFICE VISIT (OUTPATIENT)
Dept: PODIATRY | Facility: CLINIC | Age: 22
End: 2022-07-28

## 2022-07-28 VITALS — WEIGHT: 160 LBS | BODY MASS INDEX: 25.71 KG/M2 | HEIGHT: 66 IN | HEART RATE: 77 BPM

## 2022-07-28 DIAGNOSIS — M25.871 ANKLE IMPINGEMENT SYNDROME, RIGHT: ICD-10-CM

## 2022-07-28 DIAGNOSIS — M25.571 ACUTE RIGHT ANKLE PAIN: Primary | ICD-10-CM

## 2022-07-28 PROCEDURE — 99024 POSTOP FOLLOW-UP VISIT: CPT | Performed by: PODIATRIST

## 2022-07-28 NOTE — PROGRESS NOTES
"07/28/2022  Foot and Ankle Surgery - Established Patient/Follow-up  Provider: Dr. Florentino Oconnor DPM  Location: [facility]    Subjective:  Mala Kohli is a 22 y.o. female.     Chief Complaint   Patient presents with   • Right Ankle - Pain   • Post-op Follow-up     TIM De La Garza aprn 4/20/2022       HPI:     The patient is a 22-year-old female who returns 2 weeks status post ankle arthroscopy of right lower extremity. She reports mild pain. She states that she may be allergic to the dressing as she noted some dermatitis.     Allergies   Allergen Reactions   • Penicillin G Hives       Current Outpatient Medications on File Prior to Visit   Medication Sig Dispense Refill   • ESTRADIOL-NORGESTIMATE PO Take 1 tablet by mouth Every Night.     • HYDROcodone-acetaminophen (NORCO) 7.5-325 MG per tablet Take 1 tablet by mouth Every 6 (Six) Hours As Needed for Moderate Pain  (Pain). 28 tablet 0   • Loratadine 10 MG capsule Take 10 mg by mouth Every Night.     • naproxen (NAPROSYN) 500 MG tablet TAKE 1 TABLET BY MOUTH TWICE A DAY WITH MEALS (Patient taking differently: As Needed.) 60 tablet 1     No current facility-administered medications on file prior to visit.       Objective   Pulse 77   Ht 167.6 cm (66\")   Wt 72.6 kg (160 lb)   BMI 25.82 kg/m²     Foot/Ankle Exam:       General:   Appearance: appears stated age and healthy    Orientation: AAOx3    Affect: appropriate    Gait: unimpaired      VASCULAR      Right Foot Vascularity   Normal vascular exam    Dorsalis pedis:  2+  Posterior tibial:  2+  Skin Temperature: warm    Edema Grading:  None  CFT:  < 3 seconds  Pedal Hair Growth:  Present  Varicosities: none        NEUROLOGIC     Right Foot Neurologic   Light touch sensation:  Normal  Hot/Cold sensation: normal    Achilles reflex:  2+     MUSCULOSKELETAL      Right Foot Musculoskeletal   Ecchymosis:  None  Tenderness: none    Arch:  Normal     MUSCLE STRENGTH     Right Foot Muscle Strength   Normal " strength    Foot dorsiflexion:  5  Foot plantar flexion:  5  Foot inversion:  5  Foot eversion:  5     TESTS     Right Foot Tests   Anterior drawer: negative    Varus tilt: negative        Right Foot Additional Comments Continued pain and instability noted.    07/28/2022  Incision sites are dry and stable with intact sutures. No evidence of dehiscence or infection.      Assessment & Plan   Diagnoses and all orders for this visit:    1. Acute right ankle pain (Primary)    2. Ankle impingement syndrome, right  -     Ambulatory Referral to Physical Therapy        The patient is a 22-year-old female who returns 2 weeks status post ankle arthroscopy of right lower extremity. Upon physical examination, her incision sites are dry and stable with intact sutures. There is no evidence of dehiscence or infection. We discussed that, during the surgery, only mild inflammatory changes were noted with no significant concerns. Sutures were removed today. She can gradually begin to transition out of the boot and into regular, supportive tennis shoes. I recommended wearing a pull-on ankle brace when transitioning into regular shoes. The patient will follow up in 4 weeks.     Orders Placed This Encounter   Procedures   • Ambulatory Referral to Physical Therapy     Referral Priority:   Routine     Referral Type:   Physical Therapy     Referral Reason:   Specialty Services Required     Requested Specialty:   Physical Therapy     Number of Visits Requested:   1          Note is dictated utilizing voice recognition software. Unfortunately this leads to occasional typographical errors. I apologize in advance if the situation occurs. If questions occur please do not hesitate to call our office.    Transcribed from ambient dictation for EDENILSON Oconnor DPM by MELIZA RODRIGUEZ.  07/28/22   12:48 EDT    Patient verbalized consent to the visit recording.  I have personally performed the services described in this document as transcribed by  the above individual, and it is both accurate and complete.  EDENILSON Oconnor DPM  7/28/2022  18:11 EDT

## 2022-08-31 ENCOUNTER — OFFICE VISIT (OUTPATIENT)
Dept: PODIATRY | Facility: CLINIC | Age: 22
End: 2022-08-31

## 2022-08-31 VITALS — WEIGHT: 160 LBS | HEART RATE: 90 BPM | BODY MASS INDEX: 25.71 KG/M2 | HEIGHT: 66 IN

## 2022-08-31 DIAGNOSIS — M25.871 ANKLE IMPINGEMENT SYNDROME, RIGHT: ICD-10-CM

## 2022-08-31 DIAGNOSIS — M25.571 ACUTE RIGHT ANKLE PAIN: Primary | ICD-10-CM

## 2022-08-31 PROCEDURE — 99024 POSTOP FOLLOW-UP VISIT: CPT | Performed by: PODIATRIST

## 2023-03-31 ENCOUNTER — OFFICE VISIT (OUTPATIENT)
Dept: FAMILY MEDICINE CLINIC | Facility: CLINIC | Age: 23
End: 2023-03-31
Payer: MEDICAID

## 2023-03-31 VITALS
DIASTOLIC BLOOD PRESSURE: 82 MMHG | SYSTOLIC BLOOD PRESSURE: 127 MMHG | WEIGHT: 150.8 LBS | HEIGHT: 66 IN | HEART RATE: 83 BPM | BODY MASS INDEX: 24.23 KG/M2 | TEMPERATURE: 97.6 F | OXYGEN SATURATION: 100 % | RESPIRATION RATE: 20 BRPM

## 2023-03-31 DIAGNOSIS — R53.83 FATIGUE, UNSPECIFIED TYPE: ICD-10-CM

## 2023-03-31 DIAGNOSIS — Z00.00 WELLNESS EXAMINATION: Primary | ICD-10-CM

## 2023-03-31 DIAGNOSIS — Z11.59 NEED FOR HEPATITIS C SCREENING TEST: ICD-10-CM

## 2023-03-31 DIAGNOSIS — J30.1 SEASONAL ALLERGIC RHINITIS DUE TO POLLEN: ICD-10-CM

## 2023-03-31 RX ORDER — LORATADINE 10 MG/1
10 CAPSULE, LIQUID FILLED ORAL NIGHTLY
Qty: 90 EACH | Refills: 3 | Status: SHIPPED | OUTPATIENT
Start: 2023-03-31

## 2023-03-31 NOTE — PROGRESS NOTES
Chief Complaint  Annual Exam    Subjective          Mala is a 23 y.o. female  who presents to [facility]     Patient Care Team:  Court De La Garza APRN as PCP - General (Nurse Practitioner)     History of Present Illness  Adult Annual Wellness    Social History    Tobacco Use      Smoking status: Never      Smokeless tobacco: Never    Vaping Use      Vaping Use: Never used    Alcohol use: Yes      Comment: once a month; socially    Drug use: Never    Caffeine use - none    General health habits  Last eye exam - January 2023, wears glasses  Last dental exam - February 2023    Diet - Regular diet    Weight / BMI - normal, BMI 24.4    Exercise - tries to be active    Reproductive health -  Sexually active - yes  Birth control - birth control pill    Screening/prevention  Last mammogram - not applicable due to age  Last pap smear/ cervical cancer screening - never  Colon cancer screening -  not applicable due to age  Immunizations - up to date    In the [employer]        Mala Kohli  has a past medical history of Allergic rhinitis, GERD (gastroesophageal reflux disease), Migraine headache, Pain in joint of left knee, Scoliosis, Sinusitis, and Underweight.      Review of Systems   Constitutional: Positive for fatigue. Negative for fever.   HENT: Positive for ear pain, postnasal drip and rhinorrhea. Negative for sore throat.    Eyes: Positive for itching. Negative for visual disturbance.   Respiratory: Negative for cough and shortness of breath.    Cardiovascular: Negative for chest pain, palpitations and leg swelling.   Gastrointestinal: Negative for abdominal pain, blood in stool, constipation, diarrhea, nausea and vomiting.   Genitourinary: Negative for dysuria, hematuria, menstrual problem, pelvic pain, vaginal bleeding, vaginal discharge and vaginal pain.   Musculoskeletal: Positive for arthralgias (ankles).   Skin: Negative for rash.   Allergic/Immunologic:  Positive for environmental allergies.   Neurological: Negative for dizziness and headaches.   Psychiatric/Behavioral: Negative for dysphoric mood. The patient is nervous/anxious.         Family History   Problem Relation Age of Onset   • Skin cancer Mother    • Allergies Mother    • Other Mother         ALLERGIES   • Nephrolithiasis Mother    • Colon cancer Father    • Allergies Father    • Psoriasis Sister    • No Known Problems Brother    • Allergies Brother    • Nephrolithiasis Maternal Grandmother    • Parkinsonism Maternal Grandfather         Past Surgical History:   Procedure Laterality Date   • ANKLE ARTHROSCOPY Right 7/15/2022    Procedure: ANKLE ARTHROSCOPY;  Surgeon: EDENILSON Oconnor DPM;  Location: [facility];  Service: Podiatry;  Laterality: Right;        Social History     Socioeconomic History   • Marital status: Single   • Number of children: 0   • Highest education level: High school graduate   Tobacco Use   • Smoking status: Never   • Smokeless tobacco: Never   Vaping Use   • Vaping Use: Never used   Substance and Sexual Activity   • Alcohol use: Yes     Comment: once a month; socially   • Drug use: Never   • Sexual activity: Yes     Birth control/protection: Birth control pill        Immunization History   Administered Date(s) Administered   • COVID-19 (Natanael Ulien) 11/06/2021   • DTaP 2001, 2001, 2001, 04/01/2003, 03/28/2006   • DTaP / HiB 04/01/2003   • Flu Vaccine Quad PF 6-35MO 02/09/2017, 09/18/2017   • FluLaval/Fluzone >6mos 12/02/2022   • FluMist 2-49yrs 09/30/2015   • H1N1 Inj 11/09/2009, 12/14/2009   • HPV Quadrivalent 02/09/2017, 03/15/2017   • Hep A, 2 Dose 02/09/2017, 09/18/2017   • Hep B, Unspecified 2001, 2001, 2001   • Hepatitis A 02/09/2017, 09/18/2017   • Hepatitis B Adult/Adolescent IM 2001, 2001, 2001   • HiB 2001, 2001, 2001, 03/28/2006   • Hpv9 02/09/2017, 03/15/2017, 09/18/2017   • IPV 2001,  "2001, 2001, 03/28/2006   • Influenza, Unspecified 12/04/2013, 02/09/2017   • MMR 04/01/2003, 03/28/2006   • Meningococcal B,(Bexsero) 03/15/2017, 05/11/2017, 09/18/2017   • Meningococcal Conjugate 05/11/2017   • Meningococcal MCV4P (Menactra) 05/01/2012   • Meningococcal, Unspecified 05/01/2012, 05/11/2017   • Pneumococcal Conjugate 13-Valent (PCV13) 2001, 2001, 2001, 04/01/2003   • Tdap 05/01/2012   • Trumenba(meningococcal B) 03/15/2017, 05/11/2017, 09/18/2017   • Varicella 05/01/2012, 05/21/2013       Objective       Current Outpatient Medications:   •  ESTRADIOL-NORGESTIMATE PO, Take 1 tablet by mouth Every Night., Disp: , Rfl:   •  Loratadine 10 MG capsule, Take 1 capsule by mouth Every Night., Disp: 90 each, Rfl: 3    Vital Signs:      /82   Pulse 83   Temp 97.6 °F (36.4 °C) (Infrared)   Resp 20   Ht 167.6 cm (65.98\")   Wt 68.4 kg (150 lb 12.8 oz)   SpO2 100%   BMI 24.35 kg/m²     Vitals:    03/31/23 1602   BP: 127/82   Pulse: 83   Resp: 20   Temp: 97.6 °F (36.4 °C)   TempSrc: Infrared   SpO2: 100%   Weight: 68.4 kg (150 lb 12.8 oz)   Height: 167.6 cm (65.98\")      Physical Exam  Vitals reviewed.   Constitutional:       General: She is not in acute distress.     Appearance: Normal appearance. She is normal weight.   HENT:      Head: Normocephalic and atraumatic.      Right Ear: Tympanic membrane, ear canal and external ear normal.      Left Ear: Tympanic membrane, ear canal and external ear normal.      Nose: Nose normal.      Mouth/Throat:      Mouth: Mucous membranes are moist.      Pharynx: Oropharynx is clear.   Eyes:      General: No scleral icterus.     Conjunctiva/sclera: Conjunctivae normal.   Neck:      Thyroid: No thyromegaly.   Cardiovascular:      Rate and Rhythm: Normal rate and regular rhythm.      Heart sounds: Normal heart sounds.   Pulmonary:      Effort: Pulmonary effort is normal. No respiratory distress.      Breath sounds: Normal breath sounds. No " wheezing.   Abdominal:      General: Bowel sounds are normal.      Palpations: Abdomen is soft. There is no mass.      Tenderness: There is no abdominal tenderness. There is no guarding or rebound.   Musculoskeletal:      Cervical back: Neck supple.      Right lower leg: No edema.      Left lower leg: No edema.   Lymphadenopathy:      Cervical: No cervical adenopathy.   Skin:     General: Skin is warm and dry.   Neurological:      Mental Status: She is alert and oriented to person, place, and time.   Psychiatric:         Mood and Affect: Mood normal.        Result Review :                PHQ-2 Depression Screening  Little interest or pleasure in doing things? 0-->not at all   Feeling down, depressed, or hopeless? 0-->not at all   PHQ-2 Total Score 0       Over the last two weeks, how often have you been bothered by the following problems?  Feeling nervous, anxious or on edge: More than half the days  Not being able to stop or control worrying: Several days  Worrying too much about different things: Several days  Trouble Relaxing: Several days  Being so restless that it is hard to sit still: Several days  Becoming easily annoyed or irritable: Not at all  Feeling afraid as if something awful might happen: Not at all  MAYELA 7 Total Score: 6  If you checked any problems, how difficult have these problems made it for you to do your work, take care of things at home, or get along with other people: Somewhat difficult           Assessment and Plan    Diagnoses and all orders for this visit:    1. Wellness examination (Primary)  Assessment & Plan:  Discussed preventative healthcare.  Labs ordered.  Recommended annual eye and dental exams.  She thinks her vaccines are up to date through the [employer].    Orders:  -     CBC & Differential  -     Comprehensive Metabolic Panel  -     Lipid Panel  -     TSH Rfx On Abnormal To Free T4    2. Seasonal allergic rhinitis due to pollen  Overview:  History of  immunotherapy    Assessment & Plan:  Continue loratadine 10 mg daily as needed.    Orders:  -     Loratadine 10 MG capsule; Take 1 capsule by mouth Every Night.  Dispense: 90 each; Refill: 3    3. Fatigue, unspecified type  Comments:  Check labs.  Orders:  -     Vitamin B12  -     Folate    4. Need for hepatitis C screening test  -     Hepatitis C Antibody           Follow Up   Return in about 1 year (around 3/31/2024) for Annual physical.  Patient was given instructions and counseling regarding her condition or for health maintenance advice. Please see specific information pulled into the AVS if appropriate.    There are no Patient Instructions on file for this visit.

## 2023-04-02 PROBLEM — Z00.00 WELLNESS EXAMINATION: Status: ACTIVE | Noted: 2023-04-02

## 2023-04-02 NOTE — ASSESSMENT & PLAN NOTE
Discussed preventative healthcare.  Labs ordered.  Recommended annual eye and dental exams.  She thinks her vaccines are up to date through the [employer].

## 2023-04-08 LAB
ALBUMIN SERPL-MCNC: 4.5 G/DL (ref 3.9–5)
ALBUMIN/GLOB SERPL: 1.7 {RATIO} (ref 1.2–2.2)
ALP SERPL-CCNC: 74 IU/L (ref 44–121)
ALT SERPL-CCNC: 17 IU/L (ref 0–32)
AST SERPL-CCNC: 16 IU/L (ref 0–40)
BASOPHILS # BLD AUTO: 0.1 X10E3/UL (ref 0–0.2)
BASOPHILS NFR BLD AUTO: 1 %
BILIRUB SERPL-MCNC: 0.4 MG/DL (ref 0–1.2)
BUN SERPL-MCNC: 7 MG/DL (ref 6–20)
BUN/CREAT SERPL: 13 (ref 9–23)
CALCIUM SERPL-MCNC: 9.8 MG/DL (ref 8.7–10.2)
CHLORIDE SERPL-SCNC: 104 MMOL/L (ref 96–106)
CHOLEST SERPL-MCNC: 137 MG/DL (ref 100–199)
CO2 SERPL-SCNC: 23 MMOL/L (ref 20–29)
CREAT SERPL-MCNC: 0.52 MG/DL (ref 0.57–1)
EGFRCR SERPLBLD CKD-EPI 2021: 135 ML/MIN/1.73
EOSINOPHIL # BLD AUTO: 0.1 X10E3/UL (ref 0–0.4)
EOSINOPHIL NFR BLD AUTO: 1 %
ERYTHROCYTE [DISTWIDTH] IN BLOOD BY AUTOMATED COUNT: 12.4 % (ref 11.7–15.4)
FOLATE SERPL-MCNC: 7 NG/ML
GLOBULIN SER CALC-MCNC: 2.7 G/DL (ref 1.5–4.5)
GLUCOSE SERPL-MCNC: 81 MG/DL (ref 70–99)
HCT VFR BLD AUTO: 39.8 % (ref 34–46.6)
HCV IGG SERPL QL IA: NON REACTIVE
HDLC SERPL-MCNC: 42 MG/DL
HGB BLD-MCNC: 13.3 G/DL (ref 11.1–15.9)
IMM GRANULOCYTES # BLD AUTO: 0 X10E3/UL (ref 0–0.1)
IMM GRANULOCYTES NFR BLD AUTO: 0 %
LDLC SERPL CALC-MCNC: 79 MG/DL (ref 0–99)
LYMPHOCYTES # BLD AUTO: 2.5 X10E3/UL (ref 0.7–3.1)
LYMPHOCYTES NFR BLD AUTO: 27 %
MCH RBC QN AUTO: 28.4 PG (ref 26.6–33)
MCHC RBC AUTO-ENTMCNC: 33.4 G/DL (ref 31.5–35.7)
MCV RBC AUTO: 85 FL (ref 79–97)
MONOCYTES # BLD AUTO: 0.5 X10E3/UL (ref 0.1–0.9)
MONOCYTES NFR BLD AUTO: 5 %
NEUTROPHILS # BLD AUTO: 6.2 X10E3/UL (ref 1.4–7)
NEUTROPHILS NFR BLD AUTO: 66 %
PLATELET # BLD AUTO: 453 X10E3/UL (ref 150–450)
POTASSIUM SERPL-SCNC: 4.6 MMOL/L (ref 3.5–5.2)
PROT SERPL-MCNC: 7.2 G/DL (ref 6–8.5)
RBC # BLD AUTO: 4.69 X10E6/UL (ref 3.77–5.28)
SODIUM SERPL-SCNC: 139 MMOL/L (ref 134–144)
TRIGL SERPL-MCNC: 85 MG/DL (ref 0–149)
TSH SERPL DL<=0.005 MIU/L-ACNC: 1.04 UIU/ML (ref 0.45–4.5)
VIT B12 SERPL-MCNC: 240 PG/ML (ref 232–1245)
VLDLC SERPL CALC-MCNC: 16 MG/DL (ref 5–40)
WBC # BLD AUTO: 9.5 X10E3/UL (ref 3.4–10.8)

## 2023-04-09 NOTE — PROGRESS NOTES
Let her know:    1.  CBC -  complete blood cell count (CBC) is normal.     2. CMP - blood sugar, electrolytes, kidney function and liver enzymes are normal.    3. Lipid panel - lipid panel (cholesterol) is normal.     4. TSH - thyroid function is normal.     5. Hepatitis C antibody is normal.    6. Vitamin B12 - is low normal.  Begin a supplement of Vitamin B12 1000 mcg daily.  She can buy this over the counter.    7. Folate - folate level is normal.      8. Let her know since she is over 21 years old she needs to make an appointment with her PCP or an OB/GYN for a pap smear.    9. Ask her if she has vaccine records from [employer] so we can update her chart on her vaccines.

## (undated) DEVICE — UNDERGLV SURG BIOGEL INDICAT PI SZ8 BLU

## (undated) DEVICE — APPL CHLORAPREP HI/LITE TINTED 10.5ML ORNG

## (undated) DEVICE — KT SURG TURNOVER 050

## (undated) DEVICE — SUT VIC 2/0 CT2 27IN J269H

## (undated) DEVICE — CONTROL SYRINGE LUER-LOCK TIP: Brand: MONOJECT

## (undated) DEVICE — PK EXTREM 50

## (undated) DEVICE — PAD UNDERCAST WYTEX 4IN 4YD LF STRL

## (undated) DEVICE — BNDG ESMARK 4IN 12FT LF STRL BLU

## (undated) DEVICE — SLV SCD CALF HEMOFORCE DVT THERP REPROC MD

## (undated) DEVICE — BLD DISSCT COOL CUT SJ 3MM 7CM

## (undated) DEVICE — GAUZE,SPONGE,FLUFF,6"X6.75",STRL,5/TRAY: Brand: MEDLINE

## (undated) DEVICE — GLV SURG BIOGEL M LTX PF 7 1/2

## (undated) DEVICE — TUBING, SUCTION, 1/4" X 12', STRAIGHT: Brand: MEDLINE

## (undated) DEVICE — DRSNG GZ PETROLTM XEROFORM CURAD 1X8IN STRL

## (undated) DEVICE — PENCL HND ROCKRSWTCH HOLSTR EZ CLEAN TP CRD 10FT

## (undated) DEVICE — SOL IRR NACL 0.9PCT ARTHROMATIC 3000ML

## (undated) DEVICE — DRAPE,ORTHOMAX,ARTHRO T ,W/ POUCH: Brand: MEDLINE

## (undated) DEVICE — DRAPE,U/ SHT,SPLIT,PLAS,STERIL: Brand: MEDLINE

## (undated) DEVICE — GOWN,REINFORCE,POLY,SIRUS,BREATH SLV,XLG: Brand: MEDLINE

## (undated) DEVICE — NDL HYPO PRECISIONGLIDE/REG 18G 11/2 PNK